# Patient Record
Sex: FEMALE | Race: WHITE | Employment: FULL TIME | URBAN - METROPOLITAN AREA
[De-identification: names, ages, dates, MRNs, and addresses within clinical notes are randomized per-mention and may not be internally consistent; named-entity substitution may affect disease eponyms.]

---

## 2018-06-01 ENCOUNTER — ANESTHESIA (OUTPATIENT)
Dept: OPERATING ROOM | Age: 38
End: 2018-06-01
Payer: COMMERCIAL

## 2018-06-01 ENCOUNTER — HOSPITAL ENCOUNTER (OUTPATIENT)
Age: 38
Setting detail: OUTPATIENT SURGERY
Discharge: HOME | End: 2018-06-01
Attending: LEGAL MEDICINE | Admitting: LEGAL MEDICINE
Payer: COMMERCIAL

## 2018-06-01 ENCOUNTER — ANESTHESIA EVENT (OUTPATIENT)
Dept: OPERATING ROOM | Age: 38
End: 2018-06-01
Payer: COMMERCIAL

## 2018-06-01 VITALS
BODY MASS INDEX: 47.09 KG/M2 | OXYGEN SATURATION: 98 % | WEIGHT: 293 LBS | HEART RATE: 64 BPM | RESPIRATION RATE: 16 BRPM | TEMPERATURE: 97 F | SYSTOLIC BLOOD PRESSURE: 110 MMHG | DIASTOLIC BLOOD PRESSURE: 66 MMHG | HEIGHT: 66 IN

## 2018-06-01 VITALS
OXYGEN SATURATION: 97 % | RESPIRATION RATE: 7 BRPM | DIASTOLIC BLOOD PRESSURE: 59 MMHG | SYSTOLIC BLOOD PRESSURE: 105 MMHG

## 2018-06-01 PROBLEM — N92.1 MENOMETRORRHAGIA: Status: ACTIVE | Noted: 2018-06-01

## 2018-06-01 LAB
ABO/RH: NORMAL
ANTIBODY SCREEN: NORMAL
HCG QUALITATIVE: NEGATIVE

## 2018-06-01 PROCEDURE — 86901 BLOOD TYPING SEROLOGIC RH(D): CPT

## 2018-06-01 PROCEDURE — 86900 BLOOD TYPING SEROLOGIC ABO: CPT

## 2018-06-01 PROCEDURE — 6360000002 HC RX W HCPCS: Performed by: ANESTHESIOLOGY

## 2018-06-01 PROCEDURE — C1886 CATHETER, ABLATION: HCPCS | Performed by: LEGAL MEDICINE

## 2018-06-01 PROCEDURE — 3700000000 HC ANESTHESIA ATTENDED CARE: Performed by: LEGAL MEDICINE

## 2018-06-01 PROCEDURE — 7100000001 HC PACU RECOVERY - ADDTL 15 MIN: Performed by: LEGAL MEDICINE

## 2018-06-01 PROCEDURE — 6360000002 HC RX W HCPCS: Performed by: LEGAL MEDICINE

## 2018-06-01 PROCEDURE — 36415 COLL VENOUS BLD VENIPUNCTURE: CPT

## 2018-06-01 PROCEDURE — 88305 TISSUE EXAM BY PATHOLOGIST: CPT

## 2018-06-01 PROCEDURE — 2580000003 HC RX 258: Performed by: LEGAL MEDICINE

## 2018-06-01 PROCEDURE — 6360000002 HC RX W HCPCS

## 2018-06-01 PROCEDURE — 86850 RBC ANTIBODY SCREEN: CPT

## 2018-06-01 PROCEDURE — 3600000003 HC SURGERY LEVEL 3 BASE: Performed by: LEGAL MEDICINE

## 2018-06-01 PROCEDURE — 6360000002 HC RX W HCPCS: Performed by: NURSE ANESTHETIST, CERTIFIED REGISTERED

## 2018-06-01 PROCEDURE — 3700000001 HC ADD 15 MINUTES (ANESTHESIA): Performed by: LEGAL MEDICINE

## 2018-06-01 PROCEDURE — 7100000010 HC PHASE II RECOVERY - FIRST 15 MIN: Performed by: LEGAL MEDICINE

## 2018-06-01 PROCEDURE — 7100000011 HC PHASE II RECOVERY - ADDTL 15 MIN: Performed by: LEGAL MEDICINE

## 2018-06-01 PROCEDURE — 84703 CHORIONIC GONADOTROPIN ASSAY: CPT

## 2018-06-01 PROCEDURE — 3600000013 HC SURGERY LEVEL 3 ADDTL 15MIN: Performed by: LEGAL MEDICINE

## 2018-06-01 PROCEDURE — 7100000000 HC PACU RECOVERY - FIRST 15 MIN: Performed by: LEGAL MEDICINE

## 2018-06-01 RX ORDER — FENTANYL CITRATE 50 UG/ML
INJECTION, SOLUTION INTRAMUSCULAR; INTRAVENOUS PRN
Status: DISCONTINUED | OUTPATIENT
Start: 2018-06-01 | End: 2018-06-01 | Stop reason: SDUPTHER

## 2018-06-01 RX ORDER — SODIUM CHLORIDE 0.9 % (FLUSH) 0.9 %
10 SYRINGE (ML) INJECTION EVERY 12 HOURS SCHEDULED
Status: DISCONTINUED | OUTPATIENT
Start: 2018-06-01 | End: 2018-06-04 | Stop reason: HOSPADM

## 2018-06-01 RX ORDER — ONDANSETRON 2 MG/ML
INJECTION INTRAMUSCULAR; INTRAVENOUS PRN
Status: DISCONTINUED | OUTPATIENT
Start: 2018-06-01 | End: 2018-06-01 | Stop reason: SDUPTHER

## 2018-06-01 RX ORDER — FENTANYL CITRATE 50 UG/ML
50 INJECTION, SOLUTION INTRAMUSCULAR; INTRAVENOUS EVERY 5 MIN PRN
Status: DISCONTINUED | OUTPATIENT
Start: 2018-06-01 | End: 2018-06-04 | Stop reason: HOSPADM

## 2018-06-01 RX ORDER — PROMETHAZINE HYDROCHLORIDE 25 MG/ML
6.25 INJECTION, SOLUTION INTRAMUSCULAR; INTRAVENOUS
Status: ACTIVE | OUTPATIENT
Start: 2018-06-01 | End: 2018-06-01

## 2018-06-01 RX ORDER — SODIUM CHLORIDE, SODIUM LACTATE, POTASSIUM CHLORIDE, CALCIUM CHLORIDE 600; 310; 30; 20 MG/100ML; MG/100ML; MG/100ML; MG/100ML
INJECTION, SOLUTION INTRAVENOUS CONTINUOUS
Status: DISCONTINUED | OUTPATIENT
Start: 2018-06-01 | End: 2018-06-04 | Stop reason: HOSPADM

## 2018-06-01 RX ORDER — SODIUM CHLORIDE 0.9 % (FLUSH) 0.9 %
10 SYRINGE (ML) INJECTION PRN
Status: CANCELLED | OUTPATIENT
Start: 2018-06-01

## 2018-06-01 RX ORDER — MEPERIDINE HYDROCHLORIDE 25 MG/ML
INJECTION INTRAMUSCULAR; INTRAVENOUS; SUBCUTANEOUS
Status: DISPENSED
Start: 2018-06-01 | End: 2018-06-01

## 2018-06-01 RX ORDER — SODIUM CHLORIDE 0.9 % (FLUSH) 0.9 %
10 SYRINGE (ML) INJECTION PRN
Status: DISCONTINUED | OUTPATIENT
Start: 2018-06-01 | End: 2018-06-04 | Stop reason: HOSPADM

## 2018-06-01 RX ORDER — MEPERIDINE HYDROCHLORIDE 25 MG/ML
12.5 INJECTION INTRAMUSCULAR; INTRAVENOUS; SUBCUTANEOUS EVERY 5 MIN PRN
Status: DISCONTINUED | OUTPATIENT
Start: 2018-06-01 | End: 2018-06-04 | Stop reason: HOSPADM

## 2018-06-01 RX ORDER — SODIUM CHLORIDE, SODIUM LACTATE, POTASSIUM CHLORIDE, CALCIUM CHLORIDE 600; 310; 30; 20 MG/100ML; MG/100ML; MG/100ML; MG/100ML
INJECTION, SOLUTION INTRAVENOUS CONTINUOUS
Status: CANCELLED | OUTPATIENT
Start: 2018-06-01

## 2018-06-01 RX ORDER — DIPHENHYDRAMINE HYDROCHLORIDE 50 MG/ML
50 INJECTION INTRAMUSCULAR; INTRAVENOUS ONCE
Status: CANCELLED | OUTPATIENT
Start: 2018-06-01

## 2018-06-01 RX ORDER — DIPHENHYDRAMINE HYDROCHLORIDE 50 MG/ML
INJECTION INTRAMUSCULAR; INTRAVENOUS
Status: COMPLETED
Start: 2018-06-01 | End: 2018-06-01

## 2018-06-01 RX ORDER — FENTANYL CITRATE 50 UG/ML
25 INJECTION, SOLUTION INTRAMUSCULAR; INTRAVENOUS EVERY 5 MIN PRN
Status: DISCONTINUED | OUTPATIENT
Start: 2018-06-01 | End: 2018-06-04 | Stop reason: HOSPADM

## 2018-06-01 RX ORDER — ONDANSETRON 2 MG/ML
4 INJECTION INTRAMUSCULAR; INTRAVENOUS EVERY 6 HOURS PRN
Status: CANCELLED | OUTPATIENT
Start: 2018-06-01

## 2018-06-01 RX ORDER — HYDROMORPHONE HCL 110MG/55ML
0.5 PATIENT CONTROLLED ANALGESIA SYRINGE INTRAVENOUS EVERY 5 MIN PRN
Status: DISCONTINUED | OUTPATIENT
Start: 2018-06-01 | End: 2018-06-04 | Stop reason: HOSPADM

## 2018-06-01 RX ORDER — HYDROMORPHONE HCL 110MG/55ML
0.25 PATIENT CONTROLLED ANALGESIA SYRINGE INTRAVENOUS EVERY 5 MIN PRN
Status: DISCONTINUED | OUTPATIENT
Start: 2018-06-01 | End: 2018-06-04 | Stop reason: HOSPADM

## 2018-06-01 RX ORDER — OXYCODONE HYDROCHLORIDE AND ACETAMINOPHEN 5; 325 MG/1; MG/1
2 TABLET ORAL EVERY 4 HOURS PRN
Status: CANCELLED | OUTPATIENT
Start: 2018-06-01

## 2018-06-01 RX ORDER — DEXAMETHASONE SODIUM PHOSPHATE 10 MG/ML
INJECTION, SOLUTION INTRAMUSCULAR; INTRAVENOUS PRN
Status: DISCONTINUED | OUTPATIENT
Start: 2018-06-01 | End: 2018-06-01 | Stop reason: SDUPTHER

## 2018-06-01 RX ORDER — ACETAMINOPHEN 325 MG/1
650 TABLET ORAL EVERY 4 HOURS PRN
Status: CANCELLED | OUTPATIENT
Start: 2018-06-01

## 2018-06-01 RX ORDER — ONDANSETRON 2 MG/ML
4 INJECTION INTRAMUSCULAR; INTRAVENOUS
Status: COMPLETED | OUTPATIENT
Start: 2018-06-01 | End: 2018-06-01

## 2018-06-01 RX ORDER — SODIUM CHLORIDE 0.9 % (FLUSH) 0.9 %
10 SYRINGE (ML) INJECTION EVERY 12 HOURS SCHEDULED
Status: CANCELLED | OUTPATIENT
Start: 2018-06-01

## 2018-06-01 RX ORDER — PROPOFOL 10 MG/ML
INJECTION, EMULSION INTRAVENOUS PRN
Status: DISCONTINUED | OUTPATIENT
Start: 2018-06-01 | End: 2018-06-01 | Stop reason: SDUPTHER

## 2018-06-01 RX ORDER — OXYCODONE HYDROCHLORIDE AND ACETAMINOPHEN 5; 325 MG/1; MG/1
1 TABLET ORAL EVERY 4 HOURS PRN
Status: CANCELLED | OUTPATIENT
Start: 2018-06-01

## 2018-06-01 RX ORDER — MIDAZOLAM HYDROCHLORIDE 1 MG/ML
INJECTION INTRAMUSCULAR; INTRAVENOUS PRN
Status: DISCONTINUED | OUTPATIENT
Start: 2018-06-01 | End: 2018-06-01 | Stop reason: SDUPTHER

## 2018-06-01 RX ADMIN — DIPHENHYDRAMINE HYDROCHLORIDE 50 MG: 50 INJECTION, SOLUTION INTRAMUSCULAR; INTRAVENOUS at 08:10

## 2018-06-01 RX ADMIN — ONDANSETRON 4 MG: 2 INJECTION INTRAMUSCULAR; INTRAVENOUS at 10:12

## 2018-06-01 RX ADMIN — MIDAZOLAM HYDROCHLORIDE 2 MG: 1 INJECTION INTRAMUSCULAR; INTRAVENOUS at 07:00

## 2018-06-01 RX ADMIN — FENTANYL CITRATE 50 MCG: 50 INJECTION, SOLUTION INTRAMUSCULAR; INTRAVENOUS at 07:25

## 2018-06-01 RX ADMIN — FENTANYL CITRATE 50 MCG: 50 INJECTION, SOLUTION INTRAMUSCULAR; INTRAVENOUS at 07:10

## 2018-06-01 RX ADMIN — MEPERIDINE HYDROCHLORIDE 12.5 MG: 25 INJECTION INTRAMUSCULAR; INTRAVENOUS; SUBCUTANEOUS at 08:25

## 2018-06-01 RX ADMIN — FENTANYL CITRATE 100 MCG: 50 INJECTION, SOLUTION INTRAMUSCULAR; INTRAVENOUS at 07:01

## 2018-06-01 RX ADMIN — ONDANSETRON 4 MG: 2 INJECTION, SOLUTION INTRAMUSCULAR; INTRAVENOUS at 07:20

## 2018-06-01 RX ADMIN — CEFAZOLIN SODIUM 3 G: 10 INJECTION, POWDER, FOR SOLUTION INTRAVENOUS at 07:11

## 2018-06-01 RX ADMIN — MEPERIDINE HYDROCHLORIDE 12.5 MG: 25 INJECTION INTRAMUSCULAR; INTRAVENOUS; SUBCUTANEOUS at 08:30

## 2018-06-01 RX ADMIN — DEXAMETHASONE SODIUM PHOSPHATE 10 MG: 10 INJECTION, SOLUTION INTRAMUSCULAR; INTRAVENOUS at 07:20

## 2018-06-01 RX ADMIN — PROPOFOL 200 MG: 10 INJECTION, EMULSION INTRAVENOUS at 07:08

## 2018-06-01 RX ADMIN — FENTANYL CITRATE 50 MCG: 50 INJECTION, SOLUTION INTRAMUSCULAR; INTRAVENOUS at 07:20

## 2018-06-01 RX ADMIN — SODIUM CHLORIDE, POTASSIUM CHLORIDE, SODIUM LACTATE AND CALCIUM CHLORIDE: 600; 310; 30; 20 INJECTION, SOLUTION INTRAVENOUS at 05:33

## 2018-06-01 RX ADMIN — ENOXAPARIN SODIUM 40 MG: 40 INJECTION SUBCUTANEOUS at 06:07

## 2018-06-01 ASSESSMENT — PULMONARY FUNCTION TESTS
PIF_VALUE: 2
PIF_VALUE: 6
PIF_VALUE: 3
PIF_VALUE: 12
PIF_VALUE: 5
PIF_VALUE: 4
PIF_VALUE: 1
PIF_VALUE: 2
PIF_VALUE: 5
PIF_VALUE: 1
PIF_VALUE: 6
PIF_VALUE: 4
PIF_VALUE: 3
PIF_VALUE: 3
PIF_VALUE: 5
PIF_VALUE: 6
PIF_VALUE: 3
PIF_VALUE: 13
PIF_VALUE: 2
PIF_VALUE: 3
PIF_VALUE: 1
PIF_VALUE: 5
PIF_VALUE: 5
PIF_VALUE: 1
PIF_VALUE: 4
PIF_VALUE: 3
PIF_VALUE: 3
PIF_VALUE: 32
PIF_VALUE: 4
PIF_VALUE: 3
PIF_VALUE: 4
PIF_VALUE: 4

## 2018-06-01 ASSESSMENT — PAIN DESCRIPTION - PAIN TYPE
TYPE: SURGICAL PAIN
TYPE: SURGICAL PAIN
TYPE: ACUTE PAIN;SURGICAL PAIN
TYPE: ACUTE PAIN;SURGICAL PAIN

## 2018-06-01 ASSESSMENT — PAIN DESCRIPTION - FREQUENCY
FREQUENCY: CONTINUOUS
FREQUENCY: INTERMITTENT
FREQUENCY: INTERMITTENT

## 2018-06-01 ASSESSMENT — PAIN DESCRIPTION - ORIENTATION
ORIENTATION: MID

## 2018-06-01 ASSESSMENT — PAIN DESCRIPTION - ONSET
ONSET: ON-GOING
ONSET: ON-GOING
ONSET: SUDDEN

## 2018-06-01 ASSESSMENT — PAIN DESCRIPTION - LOCATION
LOCATION: ABDOMEN

## 2018-06-01 ASSESSMENT — PAIN DESCRIPTION - PROGRESSION
CLINICAL_PROGRESSION: GRADUALLY WORSENING
CLINICAL_PROGRESSION: GRADUALLY IMPROVING
CLINICAL_PROGRESSION: GRADUALLY IMPROVING

## 2018-06-01 ASSESSMENT — PAIN DESCRIPTION - DESCRIPTORS
DESCRIPTORS: CRAMPING;BURNING
DESCRIPTORS: BURNING;DISCOMFORT;DULL
DESCRIPTORS: BURNING;DISCOMFORT;DULL

## 2018-06-01 ASSESSMENT — PAIN SCALES - GENERAL
PAINLEVEL_OUTOF10: 5
PAINLEVEL_OUTOF10: 0
PAINLEVEL_OUTOF10: 5
PAINLEVEL_OUTOF10: 6
PAINLEVEL_OUTOF10: 3
PAINLEVEL_OUTOF10: 2
PAINLEVEL_OUTOF10: 6

## 2018-06-01 ASSESSMENT — PAIN - FUNCTIONAL ASSESSMENT: PAIN_FUNCTIONAL_ASSESSMENT: 0-10

## 2018-12-27 ENCOUNTER — HOSPITAL ENCOUNTER (EMERGENCY)
Age: 38
Discharge: HOME | End: 2018-12-27
Payer: COMMERCIAL

## 2018-12-27 ENCOUNTER — APPOINTMENT (OUTPATIENT)
Dept: GENERAL RADIOLOGY | Age: 38
End: 2018-12-27
Payer: COMMERCIAL

## 2018-12-27 VITALS
TEMPERATURE: 98.5 F | DIASTOLIC BLOOD PRESSURE: 69 MMHG | OXYGEN SATURATION: 99 % | RESPIRATION RATE: 18 BRPM | HEIGHT: 66 IN | BODY MASS INDEX: 47.09 KG/M2 | SYSTOLIC BLOOD PRESSURE: 137 MMHG | WEIGHT: 293 LBS | HEART RATE: 99 BPM

## 2018-12-27 DIAGNOSIS — J45.901 EXACERBATION OF ASTHMA, UNSPECIFIED ASTHMA SEVERITY, UNSPECIFIED WHETHER PERSISTENT: Primary | ICD-10-CM

## 2018-12-27 PROCEDURE — 99285 EMERGENCY DEPT VISIT HI MDM: CPT

## 2018-12-27 PROCEDURE — 6370000000 HC RX 637 (ALT 250 FOR IP): Performed by: PHYSICIAN ASSISTANT

## 2018-12-27 PROCEDURE — 71046 X-RAY EXAM CHEST 2 VIEWS: CPT

## 2018-12-27 PROCEDURE — 94640 AIRWAY INHALATION TREATMENT: CPT

## 2018-12-27 PROCEDURE — 6360000002 HC RX W HCPCS: Performed by: PHYSICIAN ASSISTANT

## 2018-12-27 RX ORDER — PREDNISONE 20 MG/1
TABLET ORAL
Qty: 18 TABLET | Refills: 0 | Status: SHIPPED | OUTPATIENT
Start: 2018-12-27 | End: 2019-01-06

## 2018-12-27 RX ORDER — IPRATROPIUM BROMIDE AND ALBUTEROL SULFATE 2.5; .5 MG/3ML; MG/3ML
1 SOLUTION RESPIRATORY (INHALATION)
Status: COMPLETED | OUTPATIENT
Start: 2018-12-27 | End: 2018-12-27

## 2018-12-27 RX ORDER — ALBUTEROL SULFATE 90 UG/1
2 AEROSOL, METERED RESPIRATORY (INHALATION) EVERY 6 HOURS PRN
COMMUNITY

## 2018-12-27 RX ORDER — ALBUTEROL SULFATE 0.63 MG/3ML
1 SOLUTION RESPIRATORY (INHALATION) EVERY 6 HOURS PRN
COMMUNITY

## 2018-12-27 RX ORDER — DEXAMETHASONE SODIUM PHOSPHATE 10 MG/ML
10 INJECTION INTRAMUSCULAR; INTRAVENOUS ONCE
Status: COMPLETED | OUTPATIENT
Start: 2018-12-27 | End: 2018-12-27

## 2018-12-27 RX ADMIN — IPRATROPIUM BROMIDE AND ALBUTEROL SULFATE 1 AMPULE: .5; 3 SOLUTION RESPIRATORY (INHALATION) at 15:57

## 2018-12-27 RX ADMIN — IPRATROPIUM BROMIDE AND ALBUTEROL SULFATE 1 AMPULE: .5; 3 SOLUTION RESPIRATORY (INHALATION) at 15:30

## 2018-12-27 RX ADMIN — IPRATROPIUM BROMIDE AND ALBUTEROL SULFATE 1 AMPULE: .5; 3 SOLUTION RESPIRATORY (INHALATION) at 15:45

## 2018-12-27 RX ADMIN — DEXAMETHASONE SODIUM PHOSPHATE 10 MG: 10 INJECTION INTRAMUSCULAR; INTRAVENOUS at 15:34

## 2018-12-27 ASSESSMENT — PAIN DESCRIPTION - LOCATION: LOCATION: CHEST

## 2018-12-27 ASSESSMENT — PAIN DESCRIPTION - DESCRIPTORS: DESCRIPTORS: TIGHTNESS

## 2018-12-27 ASSESSMENT — PAIN SCALES - GENERAL: PAINLEVEL_OUTOF10: 6

## 2019-05-23 ENCOUNTER — APPOINTMENT (OUTPATIENT)
Dept: GENERAL RADIOLOGY | Age: 39
End: 2019-05-23
Payer: COMMERCIAL

## 2019-05-23 ENCOUNTER — HOSPITAL ENCOUNTER (EMERGENCY)
Age: 39
Discharge: HOME | End: 2019-05-23
Attending: EMERGENCY MEDICINE
Payer: COMMERCIAL

## 2019-05-23 VITALS
RESPIRATION RATE: 16 BRPM | OXYGEN SATURATION: 98 % | SYSTOLIC BLOOD PRESSURE: 148 MMHG | DIASTOLIC BLOOD PRESSURE: 79 MMHG | WEIGHT: 293 LBS | HEART RATE: 81 BPM | BODY MASS INDEX: 66.5 KG/M2 | TEMPERATURE: 98.6 F

## 2019-05-23 DIAGNOSIS — R10.30 LOWER ABDOMINAL PAIN: Primary | ICD-10-CM

## 2019-05-23 DIAGNOSIS — N80.9 ENDOMETRIOSIS: ICD-10-CM

## 2019-05-23 LAB
ALBUMIN SERPL-MCNC: 3.1 G/DL (ref 3.5–5.2)
ALP BLD-CCNC: 87 U/L (ref 35–104)
ALT SERPL-CCNC: 28 U/L (ref 0–32)
AMYLASE: 35 U/L (ref 20–100)
ANION GAP SERPL CALCULATED.3IONS-SCNC: 8 MMOL/L (ref 7–16)
AST SERPL-CCNC: 34 U/L (ref 0–31)
BACTERIA: ABNORMAL /HPF
BASOPHILS ABSOLUTE: 0.03 E9/L (ref 0–0.2)
BASOPHILS RELATIVE PERCENT: 0.3 % (ref 0–2)
BILIRUB SERPL-MCNC: 0.3 MG/DL (ref 0–1.2)
BILIRUBIN URINE: NEGATIVE
BLOOD, URINE: ABNORMAL
BUN BLDV-MCNC: 12 MG/DL (ref 6–20)
CALCIUM SERPL-MCNC: 8.9 MG/DL (ref 8.6–10.2)
CHLORIDE BLD-SCNC: 99 MMOL/L (ref 98–107)
CLARITY: ABNORMAL
CO2: 28 MMOL/L (ref 22–29)
COLOR: YELLOW
CREAT SERPL-MCNC: 0.7 MG/DL (ref 0.5–1)
EOSINOPHILS ABSOLUTE: 0.12 E9/L (ref 0.05–0.5)
EOSINOPHILS RELATIVE PERCENT: 1.1 % (ref 0–6)
EPITHELIAL CELLS, UA: ABNORMAL /HPF
GFR AFRICAN AMERICAN: >60
GFR NON-AFRICAN AMERICAN: >60 ML/MIN/1.73
GLUCOSE BLD-MCNC: 141 MG/DL (ref 74–99)
GLUCOSE URINE: NEGATIVE MG/DL
HCG(URINE) PREGNANCY TEST: NEGATIVE
HCT VFR BLD CALC: 39.6 % (ref 34–48)
HEMOGLOBIN: 13 G/DL (ref 11.5–15.5)
IMMATURE GRANULOCYTES #: 0.03 E9/L
IMMATURE GRANULOCYTES %: 0.3 % (ref 0–5)
KETONES, URINE: NEGATIVE MG/DL
LEUKOCYTE ESTERASE, URINE: NEGATIVE
LIPASE: 22 U/L (ref 13–60)
LYMPHOCYTES ABSOLUTE: 1.66 E9/L (ref 1.5–4)
LYMPHOCYTES RELATIVE PERCENT: 15.7 % (ref 20–42)
MCH RBC QN AUTO: 30.5 PG (ref 26–35)
MCHC RBC AUTO-ENTMCNC: 32.8 % (ref 32–34.5)
MCV RBC AUTO: 93 FL (ref 80–99.9)
MONOCYTES ABSOLUTE: 0.7 E9/L (ref 0.1–0.95)
MONOCYTES RELATIVE PERCENT: 6.6 % (ref 2–12)
NEUTROPHILS ABSOLUTE: 8 E9/L (ref 1.8–7.3)
NEUTROPHILS RELATIVE PERCENT: 76 % (ref 43–80)
NITRITE, URINE: NEGATIVE
PDW BLD-RTO: 13.5 FL (ref 11.5–15)
PH UA: 5.5 (ref 5–9)
PLATELET # BLD: 276 E9/L (ref 130–450)
PMV BLD AUTO: 10 FL (ref 7–12)
POTASSIUM REFLEX MAGNESIUM: 5.2 MMOL/L (ref 3.5–5)
PROTEIN UA: NEGATIVE MG/DL
RBC # BLD: 4.26 E12/L (ref 3.5–5.5)
RBC UA: ABNORMAL /HPF (ref 0–2)
SODIUM BLD-SCNC: 135 MMOL/L (ref 132–146)
SPECIFIC GRAVITY UA: >=1.03 (ref 1–1.03)
TOTAL PROTEIN: 6.7 G/DL (ref 6.4–8.3)
UROBILINOGEN, URINE: 0.2 E.U./DL
WBC # BLD: 10.5 E9/L (ref 4.5–11.5)
WBC UA: ABNORMAL /HPF (ref 0–5)

## 2019-05-23 PROCEDURE — 80053 COMPREHEN METABOLIC PANEL: CPT

## 2019-05-23 PROCEDURE — 83690 ASSAY OF LIPASE: CPT

## 2019-05-23 PROCEDURE — 74018 RADEX ABDOMEN 1 VIEW: CPT

## 2019-05-23 PROCEDURE — 82150 ASSAY OF AMYLASE: CPT

## 2019-05-23 PROCEDURE — 85025 COMPLETE CBC W/AUTO DIFF WBC: CPT

## 2019-05-23 PROCEDURE — 6360000002 HC RX W HCPCS: Performed by: EMERGENCY MEDICINE

## 2019-05-23 PROCEDURE — 99284 EMERGENCY DEPT VISIT MOD MDM: CPT

## 2019-05-23 PROCEDURE — 36415 COLL VENOUS BLD VENIPUNCTURE: CPT

## 2019-05-23 PROCEDURE — 96374 THER/PROPH/DIAG INJ IV PUSH: CPT

## 2019-05-23 PROCEDURE — 81001 URINALYSIS AUTO W/SCOPE: CPT

## 2019-05-23 PROCEDURE — 87088 URINE BACTERIA CULTURE: CPT

## 2019-05-23 PROCEDURE — 81025 URINE PREGNANCY TEST: CPT

## 2019-05-23 RX ORDER — KETOROLAC TROMETHAMINE 10 MG/1
10 TABLET, FILM COATED ORAL EVERY 6 HOURS PRN
Qty: 20 TABLET | Refills: 0 | Status: SHIPPED | OUTPATIENT
Start: 2019-05-23

## 2019-05-23 RX ORDER — KETOROLAC TROMETHAMINE 30 MG/ML
60 INJECTION, SOLUTION INTRAMUSCULAR; INTRAVENOUS ONCE
Status: DISCONTINUED | OUTPATIENT
Start: 2019-05-23 | End: 2019-05-23

## 2019-05-23 RX ORDER — KETOROLAC TROMETHAMINE 30 MG/ML
30 INJECTION, SOLUTION INTRAMUSCULAR; INTRAVENOUS ONCE
Status: COMPLETED | OUTPATIENT
Start: 2019-05-23 | End: 2019-05-23

## 2019-05-23 RX ORDER — LISINOPRIL 10 MG/1
10 TABLET ORAL ONCE
Status: DISCONTINUED | OUTPATIENT
Start: 2019-05-23 | End: 2019-05-23 | Stop reason: HOSPADM

## 2019-05-23 RX ADMIN — KETOROLAC TROMETHAMINE 30 MG: 30 INJECTION, SOLUTION INTRAMUSCULAR; INTRAVENOUS at 14:09

## 2019-05-23 ASSESSMENT — PAIN SCALES - GENERAL: PAINLEVEL_OUTOF10: 10

## 2019-05-23 NOTE — ED PROVIDER NOTES
Department of Emergency Medicine   ED  Provider Note  Admit Date/RoomTime: 2019 12:03 PM  ED Room:   Chief Complaint     Abdominal Pain (abd pain x 12 hours, no n/v/, loose stools, no dysuria, no vaginal c/o)    History of Present Illness   Source of history provided by:  patient. History/Exam Limitations: none. Devin Smith is a 42 y.o. old female who has a past medical history of:   Past Medical History:   Diagnosis Date    Asthma     hasnt had problems for years, 2018    Sleep apnea      who presents emergency room for evaluation of recurrent abdominal pain for the last 12 hours. No relieving factors. She describes the pain as cramping in nature. No nausea vomiting or diarrhea. No other constitutional symptoms. No dysuria or urinary frequency. No hematuria. She has known history of endometriosis for which she had an ablation last year and has had no problems since then until now. No other relieving or aggravating factors identified. ROS   Pertinent positives and negatives are stated within HPI, all other systems reviewed and are negative. Past Surgical History:   Procedure Laterality Date    APPENDECTOMY       SECTION      DILATION AND CURETTAGE OF UTERUS      NM HYSTEROSCOPY,W/ENDOMETRIAL ABLATION N/A 2018    DILATATION AND CURETTAGE HYSTEROSCOPY WITH ENDOMETRIAL ABLATION performed by Dasha Phan MD at [address]     Social History:  reports that she has never smoked. She has never used smokeless tobacco. She reports that she does not drink alcohol or use drugs. Family History: family history is not on file. Allergies: Bactrim [sulfamethoxazole-trimethoprim]; Amoxil [amoxicillin];  Imitrex [sumatriptan]; and Ultram [tramadol hcl]    Physical Exam           ED Triage Vitals [19 1207]   BP Temp Temp src Pulse Resp SpO2 Height Weight   (!) 180/80 97.5 °F (36.4 °C) -- 106 20 96 % -- (!) 412 lb (186.9 kg)      Oxygen Saturation Sodium 135 132 - 146 mmol/L    Potassium reflex Magnesium 5.2 (H) 3.5 - 5.0 mmol/L    Chloride 99 98 - 107 mmol/L    CO2 28 22 - 29 mmol/L    Anion Gap 8 7 - 16 mmol/L    Glucose 141 (H) 74 - 99 mg/dL    BUN 12 6 - 20 mg/dL    CREATININE 0.7 0.5 - 1.0 mg/dL    GFR Non-African American >60 >=60 mL/min/1.73    GFR African American >60     Calcium 8.9 8.6 - 10.2 mg/dL    Total Protein 6.7 6.4 - 8.3 g/dL    Alb 3.1 (L) 3.5 - 5.2 g/dL    Total Bilirubin 0.3 0.0 - 1.2 mg/dL    Alkaline Phosphatase 87 35 - 104 U/L    ALT 28 0 - 32 U/L    AST 34 (H) 0 - 31 U/L   Urinalysis, reflex to microscopic   Result Value Ref Range    Color, UA Yellow Straw/Yellow    Clarity, UA SLCLOUDY Clear    Glucose, Ur Negative Negative mg/dL    Bilirubin Urine Negative Negative    Ketones, Urine Negative Negative mg/dL    Specific Gravity, UA >=1.030 1.005 - 1.030    Blood, Urine MODERATE (A) Negative    pH, UA 5.5 5.0 - 9.0    Protein, UA Negative Negative mg/dL    Urobilinogen, Urine 0.2 <2.0 E.U./dL    Nitrite, Urine Negative Negative    Leukocyte Esterase, Urine Negative Negative   Lipase   Result Value Ref Range    Lipase 22 13 - 60 U/L   Amylase   Result Value Ref Range    Amylase 35 20 - 100 U/L   Microscopic Urinalysis   Result Value Ref Range    WBC, UA NONE 0 - 5 /HPF    RBC, UA 0-1 0 - 2 /HPF    Epi Cells RARE /HPF    Bacteria, UA MANY (A) /HPF   Pregnancy, urine   Result Value Ref Range    HCG(Urine) Pregnancy Test NEGATIVE NEGATIVE     Imaging: All Radiology results interpreted by Radiologist unless otherwise noted. XR ABDOMEN (KUB) (SINGLE AP VIEW)   Final Result   Nonspecific nonobstructive bowel gas pattern          ED Course / Medical Decision Making     Medications   ketorolac (TORADOL) injection 30 mg (30 mg Intravenous Given 5/23/19 1409)        Re-Evaluations:  5/23/19      Time: 1620    Patients symptoms are improving.     Consultations:             None    Procedures:   none    MDM:  Patient is showing clinical improvement will be discharged home to follow up with her primary care physician in 2-3 days. No abnormal lab values that would warrant an inpatient admission. She was advised to take medicines as prescribed with meals and to return to emergency room for any worsening symptoms. Counseling:   I have spoken with the patient and discussed todays results, in addition to providing specific details for the plan of care and counseling regarding the diagnosis and prognosis and are agreeable with the plan of care. Assessment      1. Lower abdominal pain Stable   2. Endometriosis      This patient's ED course included: a personal history and physicial examination and re-evaluation prior to disposition  This patient has remained hemodynamically stable and improved during their ED course. Plan   Discharge to home. Patient condition is good. New Medications     Discharge Medication List as of 5/23/2019  4:47 PM      START taking these medications    Details   ketorolac (TORADOL) 10 MG tablet Take 1 tablet by mouth every 6 hours as needed for Pain, Disp-20 tablet, R-0Print           Electronically signed by Kelly Gfof MD   DD: 5/23/19  **This report was transcribed using voice recognition software. Every effort was made to ensure accuracy; however, inadvertent computerized transcription errors may be present.   END OF PROVIDER NOTE      Kelly Goff MD  05/23/19 2006

## 2019-05-25 LAB — URINE CULTURE, ROUTINE: NORMAL

## 2021-09-10 ENCOUNTER — APPOINTMENT (OUTPATIENT)
Dept: CT IMAGING | Age: 41
DRG: 690 | End: 2021-09-10
Payer: COMMERCIAL

## 2021-09-10 ENCOUNTER — HOSPITAL ENCOUNTER (EMERGENCY)
Age: 41
Discharge: HOME | DRG: 690 | End: 2021-09-10
Attending: EMERGENCY MEDICINE
Payer: COMMERCIAL

## 2021-09-10 VITALS
RESPIRATION RATE: 22 BRPM | HEART RATE: 109 BPM | DIASTOLIC BLOOD PRESSURE: 86 MMHG | OXYGEN SATURATION: 97 % | SYSTOLIC BLOOD PRESSURE: 145 MMHG | TEMPERATURE: 99.8 F

## 2021-09-10 DIAGNOSIS — R10.84 GENERALIZED ABDOMINAL PAIN: Primary | ICD-10-CM

## 2021-09-10 DIAGNOSIS — R11.2 NAUSEA AND VOMITING, INTRACTABILITY OF VOMITING NOT SPECIFIED, UNSPECIFIED VOMITING TYPE: ICD-10-CM

## 2021-09-10 LAB
ALBUMIN SERPL-MCNC: 3.6 G/DL (ref 3.5–5.2)
ALP BLD-CCNC: 106 U/L (ref 35–104)
ALT SERPL-CCNC: 50 U/L (ref 0–32)
AMORPHOUS: ABNORMAL
ANION GAP SERPL CALCULATED.3IONS-SCNC: 12 MMOL/L (ref 7–16)
APTT: 25.3 SEC (ref 24.5–35.1)
AST SERPL-CCNC: 36 U/L (ref 0–31)
BACTERIA: ABNORMAL /HPF
BASOPHILS ABSOLUTE: 0 E9/L (ref 0–0.2)
BASOPHILS RELATIVE PERCENT: 0.3 % (ref 0–2)
BILIRUB SERPL-MCNC: 1 MG/DL (ref 0–1.2)
BILIRUBIN URINE: NEGATIVE
BLOOD, URINE: ABNORMAL
BUN BLDV-MCNC: 11 MG/DL (ref 6–20)
CALCIUM SERPL-MCNC: 9 MG/DL (ref 8.6–10.2)
CHLORIDE BLD-SCNC: 103 MMOL/L (ref 98–107)
CLARITY: CLEAR
CO2: 23 MMOL/L (ref 22–29)
COLOR: YELLOW
CREAT SERPL-MCNC: 0.8 MG/DL (ref 0.5–1)
EKG ATRIAL RATE: 110 BPM
EKG P AXIS: 46 DEGREES
EKG P-R INTERVAL: 126 MS
EKG Q-T INTERVAL: 318 MS
EKG QRS DURATION: 84 MS
EKG QTC CALCULATION (BAZETT): 430 MS
EKG R AXIS: 44 DEGREES
EKG T AXIS: 32 DEGREES
EKG VENTRICULAR RATE: 110 BPM
EOSINOPHILS ABSOLUTE: 0.05 E9/L (ref 0.05–0.5)
EOSINOPHILS RELATIVE PERCENT: 0.9 % (ref 0–6)
EPITHELIAL CELLS, UA: ABNORMAL /HPF
GFR AFRICAN AMERICAN: >60
GFR NON-AFRICAN AMERICAN: >60 ML/MIN/1.73
GLUCOSE BLD-MCNC: 118 MG/DL (ref 74–99)
GLUCOSE URINE: NEGATIVE MG/DL
HCG(URINE) PREGNANCY TEST: NEGATIVE
HCT VFR BLD CALC: 42 % (ref 34–48)
HEMOGLOBIN: 13.8 G/DL (ref 11.5–15.5)
INR BLD: 1.1
KETONES, URINE: NEGATIVE MG/DL
LACTIC ACID, SEPSIS: 2.3 MMOL/L (ref 0.5–1.9)
LACTIC ACID, SEPSIS: 3.9 MMOL/L (ref 0.5–1.9)
LEUKOCYTE ESTERASE, URINE: NEGATIVE
LIPASE: 20 U/L (ref 13–60)
LYMPHOCYTES ABSOLUTE: 0.18 E9/L (ref 1.5–4)
LYMPHOCYTES RELATIVE PERCENT: 2.6 % (ref 20–42)
MAGNESIUM: 1.3 MG/DL (ref 1.6–2.6)
MCH RBC QN AUTO: 30.5 PG (ref 26–35)
MCHC RBC AUTO-ENTMCNC: 32.9 % (ref 32–34.5)
MCV RBC AUTO: 92.7 FL (ref 80–99.9)
MONOCYTES ABSOLUTE: 0.06 E9/L (ref 0.1–0.95)
MONOCYTES RELATIVE PERCENT: 0.9 % (ref 2–12)
NEUTROPHILS ABSOLUTE: 5.86 E9/L (ref 1.8–7.3)
NEUTROPHILS RELATIVE PERCENT: 95.6 % (ref 43–80)
NITRITE, URINE: NEGATIVE
PDW BLD-RTO: 13.5 FL (ref 11.5–15)
PH UA: 5.5 (ref 5–9)
PLATELET # BLD: 233 E9/L (ref 130–450)
PMV BLD AUTO: 10 FL (ref 7–12)
POTASSIUM SERPL-SCNC: 3.8 MMOL/L (ref 3.5–5)
PRO-BNP: 34 PG/ML (ref 0–125)
PROTEIN UA: NEGATIVE MG/DL
PROTHROMBIN TIME: 12.6 SEC (ref 9.3–12.4)
RBC # BLD: 4.53 E12/L (ref 3.5–5.5)
RBC # BLD: NORMAL 10*6/UL
RBC UA: ABNORMAL /HPF (ref 0–2)
SODIUM BLD-SCNC: 138 MMOL/L (ref 132–146)
SPECIFIC GRAVITY UA: 1.01 (ref 1–1.03)
TOTAL PROTEIN: 6.9 G/DL (ref 6.4–8.3)
UROBILINOGEN, URINE: 0.2 E.U./DL
VACUOLATED NEUTROPHILS: ABNORMAL
WBC # BLD: 6.1 E9/L (ref 4.5–11.5)
WBC UA: ABNORMAL /HPF (ref 0–5)

## 2021-09-10 PROCEDURE — 87186 SC STD MICRODIL/AGAR DIL: CPT

## 2021-09-10 PROCEDURE — 81025 URINE PREGNANCY TEST: CPT

## 2021-09-10 PROCEDURE — 87040 BLOOD CULTURE FOR BACTERIA: CPT

## 2021-09-10 PROCEDURE — 6360000002 HC RX W HCPCS: Performed by: STUDENT IN AN ORGANIZED HEALTH CARE EDUCATION/TRAINING PROGRAM

## 2021-09-10 PROCEDURE — 6360000004 HC RX CONTRAST MEDICATION: Performed by: RADIOLOGY

## 2021-09-10 PROCEDURE — 83605 ASSAY OF LACTIC ACID: CPT

## 2021-09-10 PROCEDURE — 74177 CT ABD & PELVIS W/CONTRAST: CPT

## 2021-09-10 PROCEDURE — 80053 COMPREHEN METABOLIC PANEL: CPT

## 2021-09-10 PROCEDURE — 83880 ASSAY OF NATRIURETIC PEPTIDE: CPT

## 2021-09-10 PROCEDURE — 2580000003 HC RX 258: Performed by: STUDENT IN AN ORGANIZED HEALTH CARE EDUCATION/TRAINING PROGRAM

## 2021-09-10 PROCEDURE — 93005 ELECTROCARDIOGRAM TRACING: CPT | Performed by: STUDENT IN AN ORGANIZED HEALTH CARE EDUCATION/TRAINING PROGRAM

## 2021-09-10 PROCEDURE — 85610 PROTHROMBIN TIME: CPT

## 2021-09-10 PROCEDURE — 87150 DNA/RNA AMPLIFIED PROBE: CPT

## 2021-09-10 PROCEDURE — 85730 THROMBOPLASTIN TIME PARTIAL: CPT

## 2021-09-10 PROCEDURE — 83735 ASSAY OF MAGNESIUM: CPT

## 2021-09-10 PROCEDURE — 85025 COMPLETE CBC W/AUTO DIFF WBC: CPT

## 2021-09-10 PROCEDURE — 93010 ELECTROCARDIOGRAM REPORT: CPT | Performed by: INTERNAL MEDICINE

## 2021-09-10 PROCEDURE — 81001 URINALYSIS AUTO W/SCOPE: CPT

## 2021-09-10 PROCEDURE — 36415 COLL VENOUS BLD VENIPUNCTURE: CPT

## 2021-09-10 PROCEDURE — 87088 URINE BACTERIA CULTURE: CPT

## 2021-09-10 PROCEDURE — 83690 ASSAY OF LIPASE: CPT

## 2021-09-10 PROCEDURE — 87077 CULTURE AEROBIC IDENTIFY: CPT

## 2021-09-10 RX ORDER — HYDROCODONE BITARTRATE AND ACETAMINOPHEN 5; 325 MG/1; MG/1
1 TABLET ORAL EVERY 4 HOURS PRN
Qty: 18 TABLET | Refills: 0 | Status: ON HOLD | OUTPATIENT
Start: 2021-09-10 | End: 2021-09-14 | Stop reason: HOSPADM

## 2021-09-10 RX ORDER — ONDANSETRON 2 MG/ML
4 INJECTION INTRAMUSCULAR; INTRAVENOUS ONCE
Status: COMPLETED | OUTPATIENT
Start: 2021-09-10 | End: 2021-09-10

## 2021-09-10 RX ORDER — KETOROLAC TROMETHAMINE 15 MG/ML
15 INJECTION, SOLUTION INTRAMUSCULAR; INTRAVENOUS ONCE
Status: COMPLETED | OUTPATIENT
Start: 2021-09-10 | End: 2021-09-10

## 2021-09-10 RX ORDER — ONDANSETRON 4 MG/1
4 TABLET, ORALLY DISINTEGRATING ORAL EVERY 12 HOURS PRN
Qty: 4 TABLET | Refills: 0 | Status: ON HOLD | OUTPATIENT
Start: 2021-09-10 | End: 2021-09-14 | Stop reason: HOSPADM

## 2021-09-10 RX ORDER — MORPHINE SULFATE 10 MG/ML
6 INJECTION, SOLUTION INTRAMUSCULAR; INTRAVENOUS ONCE
Status: COMPLETED | OUTPATIENT
Start: 2021-09-10 | End: 2021-09-10

## 2021-09-10 RX ORDER — FENTANYL CITRATE 50 UG/ML
50 INJECTION, SOLUTION INTRAMUSCULAR; INTRAVENOUS ONCE
Status: COMPLETED | OUTPATIENT
Start: 2021-09-10 | End: 2021-09-10

## 2021-09-10 RX ORDER — MAGNESIUM SULFATE 1 G/100ML
1000 INJECTION INTRAVENOUS ONCE
Status: COMPLETED | OUTPATIENT
Start: 2021-09-10 | End: 2021-09-10

## 2021-09-10 RX ORDER — 0.9 % SODIUM CHLORIDE 0.9 %
1000 INTRAVENOUS SOLUTION INTRAVENOUS ONCE
Status: COMPLETED | OUTPATIENT
Start: 2021-09-10 | End: 2021-09-10

## 2021-09-10 RX ADMIN — FENTANYL CITRATE 50 MCG: 0.05 INJECTION, SOLUTION INTRAMUSCULAR; INTRAVENOUS at 12:36

## 2021-09-10 RX ADMIN — SODIUM CHLORIDE 1000 ML: 9 INJECTION, SOLUTION INTRAVENOUS at 14:27

## 2021-09-10 RX ADMIN — MAGNESIUM SULFATE HEPTAHYDRATE 1000 MG: 1 INJECTION, SOLUTION INTRAVENOUS at 14:28

## 2021-09-10 RX ADMIN — ONDANSETRON 4 MG: 2 INJECTION INTRAMUSCULAR; INTRAVENOUS at 12:36

## 2021-09-10 RX ADMIN — MORPHINE SULFATE 6 MG: 10 INJECTION, SOLUTION INTRAMUSCULAR; INTRAVENOUS at 17:28

## 2021-09-10 RX ADMIN — SODIUM CHLORIDE 1000 ML: 9 INJECTION, SOLUTION INTRAVENOUS at 12:35

## 2021-09-10 RX ADMIN — KETOROLAC TROMETHAMINE 15 MG: 15 INJECTION, SOLUTION INTRAMUSCULAR; INTRAVENOUS at 14:27

## 2021-09-10 RX ADMIN — IOPAMIDOL 75 ML: 755 INJECTION, SOLUTION INTRAVENOUS at 14:19

## 2021-09-10 RX ADMIN — ONDANSETRON 4 MG: 2 INJECTION INTRAMUSCULAR; INTRAVENOUS at 17:28

## 2021-09-10 ASSESSMENT — PAIN SCALES - GENERAL
PAINLEVEL_OUTOF10: 9
PAINLEVEL_OUTOF10: 10
PAINLEVEL_OUTOF10: 8

## 2021-09-10 ASSESSMENT — ENCOUNTER SYMPTOMS: TACHYPNEA: 1

## 2021-09-10 NOTE — ED PROVIDER NOTES
[address]      Pt Name: Ros Lynn  MRN: [id number]  [place] 1980  Date of evaluation: 9/10/2021      CHIEF COMPLAINT       Chief Complaint   Patient presents with    Nausea     Pt C/O abd pain / N/V / chills and fatigue x ? days.  Emesis    Abdominal Pain    Chills        HPI  Ros Lynn is a 46 y.o. female with past medical history of tubal ligation, endometrial ablation, appendectomy presents with complaints of nausea, vomiting, chills, and right flank pain. She describes her pain as dull, achy, right flank. States it is nonradiating. No alleviating or exacerbating factors. Not taking any medications or measures to alleviate her symptoms. Admits to bleeding but cannot state if this is hematuria or vaginal bleeding. Denies dysuria, polyuria, hematochezia, vaginal discharge, or trauma. Except as noted above the remainder of the review of systems was reviewed and negative. Review of Systems   Constitutional: Negative for chills and fever. HENT: Negative for ear pain, sinus pressure and sore throat. Eyes: Negative for visual disturbance. Respiratory: Negative for cough, shortness of breath and wheezing. Cardiovascular: Negative for chest pain, palpitations and leg swelling. Gastrointestinal: Positive for abdominal pain. Negative for abdominal distention, constipation, diarrhea, nausea and vomiting. Endocrine: Negative for polyuria. Genitourinary: Positive for flank pain. Negative for dysuria and frequency. Musculoskeletal: Negative for arthralgias and back pain. Skin: Negative for color change, pallor, rash and wound. Neurological: Negative for weakness and headaches. Hematological: Negative for adenopathy. Psychiatric/Behavioral: Negative for agitation. All other systems reviewed and are negative. Physical Exam  Vitals and nursing note reviewed.  Exam conducted with a chaperone present. Constitutional:       General: She is not in acute distress. Appearance: Normal appearance. She is not ill-appearing or diaphoretic. HENT:      Head: Normocephalic and atraumatic. Nose: Nose normal.   Eyes:      Extraocular Movements: Extraocular movements intact. Pupils: Pupils are equal, round, and reactive to light. Cardiovascular:      Rate and Rhythm: Regular rhythm. Tachycardia present. Pulses: Normal pulses. Heart sounds: Normal heart sounds. No murmur heard. No gallop. Pulmonary:      Effort: Pulmonary effort is normal.      Breath sounds: Normal breath sounds. Abdominal:      General: Abdomen is flat. Bowel sounds are normal.      Palpations: Abdomen is soft. Tenderness: There is abdominal tenderness. There is no right CVA tenderness, left CVA tenderness or rebound. Negative signs include Flynn's sign, Rovsing's sign and McBurney's sign. Hernia: No hernia is present. Genitourinary:     Comments: Chaperone at bedside. Cervical os appreciated. No blood in vaginal vault. No lesions. No lacerations. Musculoskeletal:         General: Normal range of motion. Cervical back: Normal range of motion. Skin:     General: Skin is warm and dry. Capillary Refill: Capillary refill takes less than 2 seconds. Neurological:      General: No focal deficit present. Mental Status: She is alert and oriented to person, place, and time. Psychiatric:         Mood and Affect: Mood normal.          Procedures     MDM  Number of Diagnoses or Management Options  Generalized abdominal pain  Nausea and vomiting, intractability of vomiting not specified, unspecified vomiting type  Diagnosis management comments: 80-year-old female with past medical history of endometriosis, with surgical history of endometrial ablation, as well as tubal ligation presents with complaints of nausea, vomiting, right flank pain.   Vitals significant for tachycardia. On physical exam patient is in no acute distress, but does seem uncomfortable. She is obese. With diffuse abdominal pain. No rebound or guarding appreciated. Negative CVA tenderness bilaterally. Chaperone at bedside. Pelvic exam showed no blood in vaginal vault. No lacerations or tears appreciated. Diagnostic labs and imaging interpreted and reviewed. Significant for hematuria and urinalysis. I suspect in patient may had had a ureteral stone that has passed. No signs of infection on urinalysis. I do not suspect this is pyelonephritis. She had no adnexal tenderness. I do not suspect that the patient has ovarian torsion. Patient was given analgesic medication, antiemetics with moderate relief of symptoms. She was informed of all the results evaluation she is agreeable plan for discharge. She will follow-up with her primary care physician as soon as she is able. Amount and/or Complexity of Data Reviewed  Decide to obtain previous medical records or to obtain history from someone other than the patient: yes              --------------------------------------------- PAST HISTORY ---------------------------------------------  Past Medical History:  has a past medical history of Asthma and Sleep apnea. Past Surgical History:  has a past surgical history that includes Appendectomy;  section; Tubal ligation; Dilation and curettage of uterus; and pr hysteroscopy,w/endometrial ablation (N/A, 2018). Social History:  reports that she has never smoked. She has never used smokeless tobacco. She reports that she does not drink alcohol and does not use drugs. Family History: family history is not on file. The patients home medications have been reviewed.     Allergies: Bactrim [sulfamethoxazole-trimethoprim], Amoxil [amoxicillin], Imitrex [sumatriptan], and Ultram [tramadol hcl]    -------------------------------------------------- RESULTS -------------------------------------------------  Labs:  Results for orders placed or performed during the hospital encounter of 09/10/21   CBC Auto Differential   Result Value Ref Range    WBC 6.1 4.5 - 11.5 E9/L    RBC 4.53 3.50 - 5.50 E12/L    Hemoglobin 13.8 11.5 - 15.5 g/dL    Hematocrit 42.0 34.0 - 48.0 %    MCV 92.7 80.0 - 99.9 fL    MCH 30.5 26.0 - 35.0 pg    MCHC 32.9 32.0 - 34.5 %    RDW 13.5 11.5 - 15.0 fL    Platelets 341 733 - 546 E9/L    MPV 10.0 7.0 - 12.0 fL    Neutrophils % 95.6 (H) 43.0 - 80.0 %    Lymphocytes % 2.6 (L) 20.0 - 42.0 %    Monocytes % 0.9 (L) 2.0 - 12.0 %    Eosinophils % 0.9 0.0 - 6.0 %    Basophils % 0.3 0.0 - 2.0 %    Neutrophils Absolute 5.86 1.80 - 7.30 E9/L    Lymphocytes Absolute 0.18 (L) 1.50 - 4.00 E9/L    Monocytes Absolute 0.06 (L) 0.10 - 0.95 E9/L    Eosinophils Absolute 0.05 0.05 - 0.50 E9/L    Basophils Absolute 0.00 0.00 - 0.20 E9/L    Vacuolated Neutrophils 1+     RBC Morphology Normal    Comprehensive Metabolic Panel   Result Value Ref Range    Sodium 138 132 - 146 mmol/L    Potassium 3.8 3.5 - 5.0 mmol/L    Chloride 103 98 - 107 mmol/L    CO2 23 22 - 29 mmol/L    Anion Gap 12 7 - 16 mmol/L    Glucose 118 (H) 74 - 99 mg/dL    BUN 11 6 - 20 mg/dL    CREATININE 0.8 0.5 - 1.0 mg/dL    GFR Non-African American >60 >=60 mL/min/1.73    GFR African American >60     Calcium 9.0 8.6 - 10.2 mg/dL    Total Protein 6.9 6.4 - 8.3 g/dL    Albumin 3.6 3.5 - 5.2 g/dL    Total Bilirubin 1.0 0.0 - 1.2 mg/dL    Alkaline Phosphatase 106 (H) 35 - 104 U/L    ALT 50 (H) 0 - 32 U/L    AST 36 (H) 0 - 31 U/L   Magnesium   Result Value Ref Range    Magnesium 1.3 (L) 1.6 - 2.6 mg/dL   Brain Natriuretic Peptide   Result Value Ref Range    Pro-BNP 34 0 - 125 pg/mL   Protime-INR   Result Value Ref Range    Protime 12.6 (H) 9.3 - 12.4 sec    INR 1.1    APTT   Result Value Ref Range    aPTT 25.3 24.5 - 35.1 sec   Urinalysis, reflex to microscopic   Result Value Ref Range    Color, UA Yellow Straw/Yellow    Clarity, UA Clear Clear    Glucose, Ur Negative Negative mg/dL    Bilirubin Urine Negative Negative    Ketones, Urine Negative Negative mg/dL    Specific Gravity, UA 1.015 1.005 - 1.030    Blood, Urine MODERATE (A) Negative    pH, UA 5.5 5.0 - 9.0    Protein, UA Negative Negative mg/dL    Urobilinogen, Urine 0.2 <2.0 E.U./dL    Nitrite, Urine Negative Negative    Leukocyte Esterase, Urine Negative Negative   Lactate, Sepsis   Result Value Ref Range    Lactic Acid, Sepsis 3.9 (HH) 0.5 - 1.9 mmol/L   Lipase   Result Value Ref Range    Lipase 20 13 - 60 U/L   Pregnancy, urine   Result Value Ref Range    HCG(Urine) Pregnancy Test NEGATIVE NEGATIVE   Microscopic Urinalysis   Result Value Ref Range    WBC, UA NONE 0 - 5 /HPF    RBC, UA 0-1 0 - 2 /HPF    Epithelial Cells, UA RARE /HPF    Bacteria, UA FEW (A) None Seen /HPF    Amorphous, UA RARE    Lactate, Sepsis   Result Value Ref Range    Lactic Acid, Sepsis 2.3 (H) 0.5 - 1.9 mmol/L   EKG 12 Lead   Result Value Ref Range    Ventricular Rate 110 BPM    Atrial Rate 110 BPM    P-R Interval 126 ms    QRS Duration 84 ms    Q-T Interval 318 ms    QTc Calculation (Bazett) 430 ms    P Axis 46 degrees    R Axis 44 degrees    T Axis 32 degrees     EKG read interpreted by me. Heart rate 110. Sinus tachycardia. Normal axis deviation. No QTC prolongation. No ST elevations or depressions. No previous EKG to compare to. Radiology:  CT ABDOMEN PELVIS W IV CONTRAST Additional Contrast? None   Final Result   1. No acute abnormality is seen in the abdomen or the pelvis. 2. Mild hepatomegaly with steatosis.             ------------------------- NURSING NOTES AND VITALS REVIEWED ---------------------------  Date / Time Roomed:  9/10/2021 11:23 AM  ED Bed Assignment:  19/19    The nursing notes within the ED encounter and vital signs as below have been reviewed.    BP (!) 145/86   Pulse 109   Temp 99.8 °F (37.7 °C) (Oral)   Resp 22   SpO2 97%   Oxygen Saturation Interpretation: Normal      ------------------------------------------ PROGRESS NOTES ------------------------------------------  4:51 AM EDT  I have spoken with the patient and discussed todays results, in addition to providing specific details for the plan of care and counseling regarding the diagnosis and prognosis. Their questions are answered at this time and they are agreeable with the plan. I discussed at length with them reasons for immediate return here for re evaluation. They will followup with their primary care physician by calling their office As soon as she is able. .      --------------------------------- ADDITIONAL PROVIDER NOTES ---------------------------------  At this time the patient is without objective evidence of an acute process requiring hospitalization or inpatient management. They have remained hemodynamically stable throughout their entire ED visit and are stable for discharge with outpatient follow-up. The plan has been discussed in detail and they are aware of the specific conditions for emergent return, as well as the importance of follow-up. Discharge Medication List as of 9/10/2021  5:50 PM      START taking these medications    Details   HYDROcodone-acetaminophen (NORCO) 5-325 MG per tablet Take 1 tablet by mouth every 4 hours as needed for Pain for up to 3 days. Intended supply: 3 days. Take lowest dose possible to manage pain, Disp-18 tablet, R-0Print      ondansetron (ZOFRAN ODT) 4 MG disintegrating tablet Take 1 tablet by mouth every 12 hours as needed for Nausea or Vomiting, Disp-4 tablet, R-0Print             Diagnosis:  1. Generalized abdominal pain    2. Nausea and vomiting, intractability of vomiting not specified, unspecified vomiting type        Disposition:  Patient's disposition: Discharge to home  Patient's condition is stable.        Guido Wilson MD  Resident  09/11/21 [id number]

## 2021-09-11 ENCOUNTER — HOSPITAL ENCOUNTER (INPATIENT)
Age: 41
LOS: 3 days | Discharge: HOME | DRG: 690 | End: 2021-09-14
Attending: EMERGENCY MEDICINE | Admitting: INTERNAL MEDICINE
Payer: COMMERCIAL

## 2021-09-11 DIAGNOSIS — R78.81 BACTEREMIA: Primary | ICD-10-CM

## 2021-09-11 DIAGNOSIS — N30.01 ACUTE CYSTITIS WITH HEMATURIA: ICD-10-CM

## 2021-09-11 LAB
ALBUMIN SERPL-MCNC: 2.9 G/DL (ref 3.5–5.2)
ALP BLD-CCNC: 84 U/L (ref 35–104)
ALT SERPL-CCNC: 127 U/L (ref 0–32)
ANION GAP SERPL CALCULATED.3IONS-SCNC: 8 MMOL/L (ref 7–16)
AST SERPL-CCNC: 77 U/L (ref 0–31)
BACTERIA: ABNORMAL /HPF
BASOPHILS ABSOLUTE: 0 E9/L (ref 0–0.2)
BASOPHILS RELATIVE PERCENT: 0.1 % (ref 0–2)
BILIRUB SERPL-MCNC: 0.9 MG/DL (ref 0–1.2)
BILIRUBIN URINE: ABNORMAL
BLOOD, URINE: ABNORMAL
BOTTLE TYPE: ABNORMAL
BUN BLDV-MCNC: 9 MG/DL (ref 6–20)
BURR CELLS: ABNORMAL
CALCIUM SERPL-MCNC: 8.3 MG/DL (ref 8.6–10.2)
CANDIDA ALBICANS BY PCR: NOT DETECTED
CANDIDA GLABRATA BY PCR: NOT DETECTED
CANDIDA KRUSEI BY PCR: NOT DETECTED
CANDIDA PARAPSILOSIS BY PCR: NOT DETECTED
CANDIDA TROPICALIS BY PCR: NOT DETECTED
CARBAPENEM RESISTANCE KPC BY PCR: NOT DETECTED
CHLORIDE BLD-SCNC: 100 MMOL/L (ref 98–107)
CLARITY: ABNORMAL
CO2: 25 MMOL/L (ref 22–29)
COLOR: ABNORMAL
CREAT SERPL-MCNC: 0.8 MG/DL (ref 0.5–1)
ENTEROBACTER CLOACAE COMPLEX BY PCR: NOT DETECTED
ENTEROBACTERALES BY PCR: DETECTED
EOSINOPHILS ABSOLUTE: 0.07 E9/L (ref 0.05–0.5)
EOSINOPHILS RELATIVE PERCENT: 0.9 % (ref 0–6)
EPITHELIAL CELLS, UA: ABNORMAL /HPF
ESCHERICHIA COLI BY PCR: DETECTED
GFR AFRICAN AMERICAN: >60
GFR NON-AFRICAN AMERICAN: >60 ML/MIN/1.73
GLUCOSE BLD-MCNC: 143 MG/DL (ref 74–99)
GLUCOSE URINE: NEGATIVE MG/DL
HAEMOPHILUS INFLUENZAE BY PCR: NOT DETECTED
HCT VFR BLD CALC: 36.3 % (ref 34–48)
HEMOGLOBIN: 12 G/DL (ref 11.5–15.5)
KETONES, URINE: NEGATIVE MG/DL
KLEBSIELLA OXYTOCA BY PCR: NOT DETECTED
KLEBSIELLA PNEUMONIAE GROUP BY PCR: NOT DETECTED
LACTIC ACID: 1.4 MMOL/L (ref 0.5–2.2)
LEUKOCYTE ESTERASE, URINE: ABNORMAL
LIPASE: 13 U/L (ref 13–60)
LISTERIA MONOCYTOGENES BY PCR: NOT DETECTED
LYMPHOCYTES ABSOLUTE: 0.16 E9/L (ref 1.5–4)
LYMPHOCYTES RELATIVE PERCENT: 1.7 % (ref 20–42)
MCH RBC QN AUTO: 30.8 PG (ref 26–35)
MCHC RBC AUTO-ENTMCNC: 33.1 % (ref 32–34.5)
MCV RBC AUTO: 93.1 FL (ref 80–99.9)
MONOCYTES ABSOLUTE: 0.16 E9/L (ref 0.1–0.95)
MONOCYTES RELATIVE PERCENT: 1.7 % (ref 2–12)
NEISSERIA MENINGITIDIS BY PCR: NOT DETECTED
NEUTROPHILS ABSOLUTE: 7.68 E9/L (ref 1.8–7.3)
NEUTROPHILS RELATIVE PERCENT: 95.7 % (ref 43–80)
NITRITE, URINE: NEGATIVE
ORDER NUMBER: ABNORMAL
OVALOCYTES: ABNORMAL
PDW BLD-RTO: 13.8 FL (ref 11.5–15)
PH UA: 6.5 (ref 5–9)
PLATELET # BLD: 188 E9/L (ref 130–450)
PMV BLD AUTO: 10.1 FL (ref 7–12)
POIKILOCYTES: ABNORMAL
POTASSIUM SERPL-SCNC: 3.9 MMOL/L (ref 3.5–5)
PROTEIN UA: 100 MG/DL
PROTEUS SPECIES BY PCR: NOT DETECTED
PSEUDOMONAS AERUGINOSA BY PCR: NOT DETECTED
RBC # BLD: 3.9 E12/L (ref 3.5–5.5)
RBC UA: >20 /HPF (ref 0–2)
SERRATIA MARCESCENS BY PCR: NOT DETECTED
SODIUM BLD-SCNC: 133 MMOL/L (ref 132–146)
SOURCE OF BLOOD CULTURE: ABNORMAL
SPECIFIC GRAVITY UA: 1.02 (ref 1–1.03)
STAPHYLOCOCCUS AUREUS BY PCR: NOT DETECTED
STAPHYLOCOCCUS SPECIES BY PCR: NOT DETECTED
STREPTOCOCCUS AGALACTIAE BY PCR: NOT DETECTED
STREPTOCOCCUS PNEUMONIAE BY PCR: NOT DETECTED
STREPTOCOCCUS PYOGENES  BY PCR: NOT DETECTED
STREPTOCOCCUS SPECIES BY PCR: NOT DETECTED
TOTAL PROTEIN: 6.5 G/DL (ref 6.4–8.3)
UROBILINOGEN, URINE: 2 E.U./DL
WBC # BLD: 8 E9/L (ref 4.5–11.5)
WBC UA: ABNORMAL /HPF (ref 0–5)

## 2021-09-11 PROCEDURE — 83690 ASSAY OF LIPASE: CPT

## 2021-09-11 PROCEDURE — 87088 URINE BACTERIA CULTURE: CPT

## 2021-09-11 PROCEDURE — 81001 URINALYSIS AUTO W/SCOPE: CPT

## 2021-09-11 PROCEDURE — 1200000000 HC SEMI PRIVATE

## 2021-09-11 PROCEDURE — 99283 EMERGENCY DEPT VISIT LOW MDM: CPT

## 2021-09-11 PROCEDURE — 80053 COMPREHEN METABOLIC PANEL: CPT

## 2021-09-11 PROCEDURE — 2580000003 HC RX 258: Performed by: NURSE PRACTITIONER

## 2021-09-11 PROCEDURE — 87040 BLOOD CULTURE FOR BACTERIA: CPT

## 2021-09-11 PROCEDURE — 85025 COMPLETE CBC W/AUTO DIFF WBC: CPT

## 2021-09-11 PROCEDURE — 2580000003 HC RX 258: Performed by: INTERNAL MEDICINE

## 2021-09-11 PROCEDURE — 99223 1ST HOSP IP/OBS HIGH 75: CPT | Performed by: INTERNAL MEDICINE

## 2021-09-11 PROCEDURE — 83605 ASSAY OF LACTIC ACID: CPT

## 2021-09-11 PROCEDURE — 6360000002 HC RX W HCPCS: Performed by: NURSE PRACTITIONER

## 2021-09-11 RX ORDER — ACETAMINOPHEN 650 MG/1
650 SUPPOSITORY RECTAL EVERY 6 HOURS PRN
Status: DISCONTINUED | OUTPATIENT
Start: 2021-09-11 | End: 2021-09-14 | Stop reason: HOSPADM

## 2021-09-11 RX ORDER — POLYETHYLENE GLYCOL 3350 17 G/17G
17 POWDER, FOR SOLUTION ORAL DAILY PRN
Status: DISCONTINUED | OUTPATIENT
Start: 2021-09-11 | End: 2021-09-14 | Stop reason: HOSPADM

## 2021-09-11 RX ORDER — PROMETHAZINE HYDROCHLORIDE 25 MG/1
12.5 TABLET ORAL EVERY 6 HOURS PRN
Status: DISCONTINUED | OUTPATIENT
Start: 2021-09-11 | End: 2021-09-14 | Stop reason: HOSPADM

## 2021-09-11 RX ORDER — SODIUM CHLORIDE 0.9 % (FLUSH) 0.9 %
10 SYRINGE (ML) INJECTION PRN
Status: DISCONTINUED | OUTPATIENT
Start: 2021-09-11 | End: 2021-09-14 | Stop reason: HOSPADM

## 2021-09-11 RX ORDER — ACETAMINOPHEN 325 MG/1
650 TABLET ORAL EVERY 6 HOURS PRN
Status: DISCONTINUED | OUTPATIENT
Start: 2021-09-11 | End: 2021-09-14 | Stop reason: HOSPADM

## 2021-09-11 RX ORDER — SODIUM CHLORIDE 9 MG/ML
25 INJECTION, SOLUTION INTRAVENOUS PRN
Status: DISCONTINUED | OUTPATIENT
Start: 2021-09-11 | End: 2021-09-14 | Stop reason: HOSPADM

## 2021-09-11 RX ORDER — SODIUM CHLORIDE 0.9 % (FLUSH) 0.9 %
10 SYRINGE (ML) INJECTION EVERY 12 HOURS SCHEDULED
Status: DISCONTINUED | OUTPATIENT
Start: 2021-09-11 | End: 2021-09-14 | Stop reason: HOSPADM

## 2021-09-11 RX ORDER — ONDANSETRON 2 MG/ML
4 INJECTION INTRAMUSCULAR; INTRAVENOUS EVERY 6 HOURS PRN
Status: DISCONTINUED | OUTPATIENT
Start: 2021-09-11 | End: 2021-09-14 | Stop reason: HOSPADM

## 2021-09-11 RX ADMIN — SODIUM CHLORIDE, PRESERVATIVE FREE 10 ML: 5 INJECTION INTRAVENOUS at 22:07

## 2021-09-11 RX ADMIN — WATER 2000 MG: 1 INJECTION INTRAMUSCULAR; INTRAVENOUS; SUBCUTANEOUS at 22:05

## 2021-09-11 ASSESSMENT — ENCOUNTER SYMPTOMS
SINUS PRESSURE: 0
WHEEZING: 0
ABDOMINAL DISTENTION: 0
VOMITING: 0
BACK PAIN: 0
SORE THROAT: 0
CONSTIPATION: 0
SHORTNESS OF BREATH: 0
COLOR CHANGE: 0
NAUSEA: 0
DIARRHEA: 0
ABDOMINAL PAIN: 1
COUGH: 0

## 2021-09-11 ASSESSMENT — PAIN DESCRIPTION - ORIENTATION: ORIENTATION: RIGHT

## 2021-09-11 ASSESSMENT — PAIN DESCRIPTION - PAIN TYPE: TYPE: ACUTE PAIN

## 2021-09-11 ASSESSMENT — PAIN DESCRIPTION - FREQUENCY: FREQUENCY: INTERMITTENT

## 2021-09-11 ASSESSMENT — PAIN DESCRIPTION - DESCRIPTORS: DESCRIPTORS: ACHING

## 2021-09-11 ASSESSMENT — PAIN DESCRIPTION - LOCATION: LOCATION: ABDOMEN

## 2021-09-11 ASSESSMENT — PAIN SCALES - GENERAL: PAINLEVEL_OUTOF10: 6

## 2021-09-11 NOTE — Clinical Note
Patient Class: Observation [104]   REQUIRED: Diagnosis: Bacteremia [790. 7. ICD-9-CM]   Estimated Length of Stay: Estimated stay of less than 2 midnights   Admitting Provider: Elias Linares [[id number]]   Telemetry/Cardiac Monitoring Required?: No

## 2021-09-11 NOTE — PROGRESS NOTES
Discussed blood culture results with patient, recommending she return to the ED for IV antibiotics and potential admission.  Patient agrees and will return    Erin Houston PharmD, BCPS 9/11/2021 5:52 PM   [phone]

## 2021-09-12 ENCOUNTER — APPOINTMENT (OUTPATIENT)
Dept: CT IMAGING | Age: 41
DRG: 690 | End: 2021-09-12
Payer: COMMERCIAL

## 2021-09-12 ENCOUNTER — APPOINTMENT (OUTPATIENT)
Dept: GENERAL RADIOLOGY | Age: 41
DRG: 690 | End: 2021-09-12
Payer: COMMERCIAL

## 2021-09-12 ENCOUNTER — APPOINTMENT (OUTPATIENT)
Dept: ULTRASOUND IMAGING | Age: 41
DRG: 690 | End: 2021-09-12
Payer: COMMERCIAL

## 2021-09-12 LAB
ADENOVIRUS BY PCR: NOT DETECTED
ALBUMIN SERPL-MCNC: 3.1 G/DL (ref 3.5–5.2)
ALP BLD-CCNC: 89 U/L (ref 35–104)
ALT SERPL-CCNC: 113 U/L (ref 0–32)
ANION GAP SERPL CALCULATED.3IONS-SCNC: 6 MMOL/L (ref 7–16)
AST SERPL-CCNC: 64 U/L (ref 0–31)
BASOPHILS ABSOLUTE: 0.02 E9/L (ref 0–0.2)
BASOPHILS RELATIVE PERCENT: 0.3 % (ref 0–2)
BILIRUB SERPL-MCNC: 0.7 MG/DL (ref 0–1.2)
BILIRUBIN DIRECT: 0.3 MG/DL (ref 0–0.3)
BILIRUBIN, INDIRECT: 0.4 MG/DL (ref 0–1)
BORDETELLA PARAPERTUSSIS BY PCR: NOT DETECTED
BORDETELLA PERTUSSIS BY PCR: NOT DETECTED
BUN BLDV-MCNC: 8 MG/DL (ref 6–20)
CALCIUM SERPL-MCNC: 8.4 MG/DL (ref 8.6–10.2)
CHLAMYDOPHILIA PNEUMONIAE BY PCR: NOT DETECTED
CHLORIDE BLD-SCNC: 104 MMOL/L (ref 98–107)
CO2: 26 MMOL/L (ref 22–29)
CORONAVIRUS 229E BY PCR: NOT DETECTED
CORONAVIRUS HKU1 BY PCR: NOT DETECTED
CORONAVIRUS NL63 BY PCR: NOT DETECTED
CORONAVIRUS OC43 BY PCR: NOT DETECTED
CREAT SERPL-MCNC: 0.7 MG/DL (ref 0.5–1)
EOSINOPHILS ABSOLUTE: 0.07 E9/L (ref 0.05–0.5)
EOSINOPHILS RELATIVE PERCENT: 0.9 % (ref 0–6)
GFR AFRICAN AMERICAN: >60
GFR NON-AFRICAN AMERICAN: >60 ML/MIN/1.73
GLUCOSE BLD-MCNC: 135 MG/DL (ref 74–99)
HBA1C MFR BLD: 6.7 % (ref 4–5.6)
HCT VFR BLD CALC: 35 % (ref 34–48)
HEMOGLOBIN: 11.6 G/DL (ref 11.5–15.5)
HUMAN METAPNEUMOVIRUS BY PCR: NOT DETECTED
HUMAN RHINOVIRUS/ENTEROVIRUS BY PCR: NOT DETECTED
IMMATURE GRANULOCYTES #: 0.03 E9/L
IMMATURE GRANULOCYTES %: 0.4 % (ref 0–5)
INFLUENZA A BY PCR: NOT DETECTED
INFLUENZA B BY PCR: NOT DETECTED
LIPASE: 14 U/L (ref 13–60)
LV EF: 58 %
LVEF MODALITY: NORMAL
LYMPHOCYTES ABSOLUTE: 0.86 E9/L (ref 1.5–4)
LYMPHOCYTES RELATIVE PERCENT: 11.2 % (ref 20–42)
MCH RBC QN AUTO: 30.4 PG (ref 26–35)
MCHC RBC AUTO-ENTMCNC: 33.1 % (ref 32–34.5)
MCV RBC AUTO: 91.9 FL (ref 80–99.9)
MONOCYTES ABSOLUTE: 0.51 E9/L (ref 0.1–0.95)
MONOCYTES RELATIVE PERCENT: 6.6 % (ref 2–12)
MYCOPLASMA PNEUMONIAE BY PCR: NOT DETECTED
NEUTROPHILS ABSOLUTE: 6.21 E9/L (ref 1.8–7.3)
NEUTROPHILS RELATIVE PERCENT: 80.6 % (ref 43–80)
PARAINFLUENZA VIRUS 1 BY PCR: NOT DETECTED
PARAINFLUENZA VIRUS 2 BY PCR: NOT DETECTED
PARAINFLUENZA VIRUS 3 BY PCR: NOT DETECTED
PARAINFLUENZA VIRUS 4 BY PCR: NOT DETECTED
PDW BLD-RTO: 13.6 FL (ref 11.5–15)
PLATELET # BLD: 181 E9/L (ref 130–450)
PMV BLD AUTO: 10.4 FL (ref 7–12)
POTASSIUM REFLEX MAGNESIUM: 3.9 MMOL/L (ref 3.5–5)
PROCALCITONIN: 9.76 NG/ML (ref 0–0.08)
RBC # BLD: 3.81 E12/L (ref 3.5–5.5)
RESPIRATORY SYNCYTIAL VIRUS BY PCR: NOT DETECTED
SARS-COV-2, PCR: NOT DETECTED
SODIUM BLD-SCNC: 136 MMOL/L (ref 132–146)
TOTAL CK: 229 U/L (ref 20–180)
TOTAL PROTEIN: 6.4 G/DL (ref 6.4–8.3)
URINE CULTURE, ROUTINE: NORMAL
WBC # BLD: 7.7 E9/L (ref 4.5–11.5)

## 2021-09-12 PROCEDURE — 83036 HEMOGLOBIN GLYCOSYLATED A1C: CPT

## 2021-09-12 PROCEDURE — 76775 US EXAM ABDO BACK WALL LIM: CPT

## 2021-09-12 PROCEDURE — 94660 CPAP INITIATION&MGMT: CPT

## 2021-09-12 PROCEDURE — 2580000003 HC RX 258: Performed by: NURSE PRACTITIONER

## 2021-09-12 PROCEDURE — 94640 AIRWAY INHALATION TREATMENT: CPT

## 2021-09-12 PROCEDURE — 2580000003 HC RX 258: Performed by: INTERNAL MEDICINE

## 2021-09-12 PROCEDURE — APPSS30 APP SPLIT SHARED TIME 16-30 MINUTES: Performed by: NURSE PRACTITIONER

## 2021-09-12 PROCEDURE — 76705 ECHO EXAM OF ABDOMEN: CPT

## 2021-09-12 PROCEDURE — 6370000000 HC RX 637 (ALT 250 FOR IP): Performed by: NURSE PRACTITIONER

## 2021-09-12 PROCEDURE — 80053 COMPREHEN METABOLIC PANEL: CPT

## 2021-09-12 PROCEDURE — 82247 BILIRUBIN TOTAL: CPT

## 2021-09-12 PROCEDURE — 36415 COLL VENOUS BLD VENIPUNCTURE: CPT

## 2021-09-12 PROCEDURE — 83690 ASSAY OF LIPASE: CPT

## 2021-09-12 PROCEDURE — 1200000000 HC SEMI PRIVATE

## 2021-09-12 PROCEDURE — 6360000002 HC RX W HCPCS: Performed by: NURSE PRACTITIONER

## 2021-09-12 PROCEDURE — 0202U NFCT DS 22 TRGT SARS-COV-2: CPT

## 2021-09-12 PROCEDURE — 6360000002 HC RX W HCPCS: Performed by: INTERNAL MEDICINE

## 2021-09-12 PROCEDURE — 71250 CT THORAX DX C-: CPT

## 2021-09-12 PROCEDURE — 85025 COMPLETE CBC W/AUTO DIFF WBC: CPT

## 2021-09-12 PROCEDURE — 82550 ASSAY OF CK (CPK): CPT

## 2021-09-12 PROCEDURE — 93306 TTE W/DOPPLER COMPLETE: CPT

## 2021-09-12 PROCEDURE — 6370000000 HC RX 637 (ALT 250 FOR IP): Performed by: INTERNAL MEDICINE

## 2021-09-12 PROCEDURE — 99233 SBSQ HOSP IP/OBS HIGH 50: CPT | Performed by: INTERNAL MEDICINE

## 2021-09-12 PROCEDURE — 82248 BILIRUBIN DIRECT: CPT

## 2021-09-12 PROCEDURE — 84145 PROCALCITONIN (PCT): CPT

## 2021-09-12 PROCEDURE — 94664 DEMO&/EVAL PT USE INHALER: CPT

## 2021-09-12 PROCEDURE — 71046 X-RAY EXAM CHEST 2 VIEWS: CPT

## 2021-09-12 RX ORDER — IPRATROPIUM BROMIDE AND ALBUTEROL SULFATE 2.5; .5 MG/3ML; MG/3ML
1 SOLUTION RESPIRATORY (INHALATION)
Status: DISCONTINUED | OUTPATIENT
Start: 2021-09-12 | End: 2021-09-14 | Stop reason: HOSPADM

## 2021-09-12 RX ORDER — HYDROCODONE BITARTRATE AND ACETAMINOPHEN 5; 325 MG/1; MG/1
1 TABLET ORAL EVERY 4 HOURS PRN
Status: DISCONTINUED | OUTPATIENT
Start: 2021-09-12 | End: 2021-09-14 | Stop reason: HOSPADM

## 2021-09-12 RX ORDER — SODIUM CHLORIDE 9 MG/ML
INJECTION, SOLUTION INTRAVENOUS CONTINUOUS
Status: DISCONTINUED | OUTPATIENT
Start: 2021-09-12 | End: 2021-09-13

## 2021-09-12 RX ORDER — BUDESONIDE 0.5 MG/2ML
500 INHALANT ORAL 2 TIMES DAILY
Status: DISCONTINUED | OUTPATIENT
Start: 2021-09-12 | End: 2021-09-14 | Stop reason: HOSPADM

## 2021-09-12 RX ORDER — GUAIFENESIN 100 MG/5ML
200 SOLUTION ORAL EVERY 4 HOURS PRN
Status: DISCONTINUED | OUTPATIENT
Start: 2021-09-12 | End: 2021-09-14 | Stop reason: HOSPADM

## 2021-09-12 RX ORDER — ALBUTEROL SULFATE 0.63 MG/3ML
1 SOLUTION RESPIRATORY (INHALATION) EVERY 6 HOURS PRN
Status: DISCONTINUED | OUTPATIENT
Start: 2021-09-12 | End: 2021-09-14 | Stop reason: HOSPADM

## 2021-09-12 RX ORDER — PREDNISONE 20 MG/1
40 TABLET ORAL DAILY
Status: DISCONTINUED | OUTPATIENT
Start: 2021-09-12 | End: 2021-09-14 | Stop reason: HOSPADM

## 2021-09-12 RX ORDER — PANTOPRAZOLE SODIUM 40 MG/1
40 TABLET, DELAYED RELEASE ORAL
Status: DISCONTINUED | OUTPATIENT
Start: 2021-09-13 | End: 2021-09-14 | Stop reason: HOSPADM

## 2021-09-12 RX ADMIN — ACETAMINOPHEN 650 MG: 325 TABLET ORAL at 00:11

## 2021-09-12 RX ADMIN — HYDROCODONE BITARTRATE AND ACETAMINOPHEN 1 TABLET: 5; 325 TABLET ORAL at 09:11

## 2021-09-12 RX ADMIN — GUAIFENESIN 200 MG: 200 SOLUTION ORAL at 22:31

## 2021-09-12 RX ADMIN — IPRATROPIUM BROMIDE AND ALBUTEROL SULFATE 1 AMPULE: .5; 3 SOLUTION RESPIRATORY (INHALATION) at 13:58

## 2021-09-12 RX ADMIN — HYDROCODONE BITARTRATE AND ACETAMINOPHEN 1 TABLET: 5; 325 TABLET ORAL at 18:45

## 2021-09-12 RX ADMIN — SODIUM CHLORIDE: 9 INJECTION, SOLUTION INTRAVENOUS at 13:06

## 2021-09-12 RX ADMIN — BUDESONIDE 500 MCG: 0.5 INHALANT RESPIRATORY (INHALATION) at 19:04

## 2021-09-12 RX ADMIN — GUAIFENESIN 200 MG: 200 SOLUTION ORAL at 18:07

## 2021-09-12 RX ADMIN — WATER 2000 MG: 1 INJECTION INTRAMUSCULAR; INTRAVENOUS; SUBCUTANEOUS at 22:09

## 2021-09-12 RX ADMIN — ALBUTEROL SULFATE 0.63 MG: 0.63 SOLUTION RESPIRATORY (INHALATION) at 07:46

## 2021-09-12 RX ADMIN — IPRATROPIUM BROMIDE AND ALBUTEROL SULFATE 1 AMPULE: .5; 3 SOLUTION RESPIRATORY (INHALATION) at 19:04

## 2021-09-12 RX ADMIN — HYDROCODONE BITARTRATE AND ACETAMINOPHEN 1 TABLET: 5; 325 TABLET ORAL at 23:22

## 2021-09-12 RX ADMIN — PREDNISONE 40 MG: 20 TABLET ORAL at 18:07

## 2021-09-12 RX ADMIN — HYDROCODONE BITARTRATE AND ACETAMINOPHEN 1 TABLET: 5; 325 TABLET ORAL at 02:48

## 2021-09-12 ASSESSMENT — PAIN DESCRIPTION - DESCRIPTORS
DESCRIPTORS: CRAMPING
DESCRIPTORS: DISCOMFORT;CRAMPING;DULL

## 2021-09-12 ASSESSMENT — PAIN DESCRIPTION - LOCATION
LOCATION: ABDOMEN
LOCATION: ABDOMEN

## 2021-09-12 ASSESSMENT — PAIN SCALES - GENERAL
PAINLEVEL_OUTOF10: 3
PAINLEVEL_OUTOF10: 7
PAINLEVEL_OUTOF10: 3
PAINLEVEL_OUTOF10: 8
PAINLEVEL_OUTOF10: 7
PAINLEVEL_OUTOF10: 7

## 2021-09-12 ASSESSMENT — ENCOUNTER SYMPTOMS: TACHYPNEA: 1

## 2021-09-12 ASSESSMENT — PAIN DESCRIPTION - PAIN TYPE
TYPE: SURGICAL PAIN
TYPE: ACUTE PAIN

## 2021-09-12 ASSESSMENT — PAIN DESCRIPTION - FREQUENCY: FREQUENCY: INTERMITTENT

## 2021-09-12 NOTE — PROGRESS NOTES
CREATININE 0.8 0.8 0.7   GLUCOSE 118* 143* 135*   CALCIUM 9.0 8.3* 8.4*       Recent Labs     09/10/21  1237 09/11/21 2203 09/12/21  0437   ALKPHOS 106* 84 89   PROT 6.9 6.5 6.4   LABALBU 3.6 2.9* 3.1*   BILITOT 1.0 0.9 0.7   AST 36* 77* 64*   ALT 50* 127* 113*       Recent Labs     09/10/21  1237 09/11/21 2203 09/12/21  0437   WBC 6.1 8.0 7.7   RBC 4.53 3.90 3.81   HGB 13.8 12.0 11.6   HCT 42.0 36.3 35.0   MCV 92.7 93.1 91.9   MCH 30.5 30.8 30.4   MCHC 32.9 33.1 33.1   RDW 13.5 13.8 13.6    188 181   MPV 10.0 10.1 10.4           Radiology:   XR CHEST (2 VW)   Final Result   1. Cardiomegaly. There are no gross findings of CHF or pneumonia. 2. The chest x-ray is under penetrated due to the patient's body habitus. If   there is high suspicion for COVID pneumonia dedicated CT of the thorax is   recommended. US ABDOMEN LIMITED    (Results Pending)   US RETROPERITONEAL LIMITED    (Results Pending)       Assessment:  Principal Problem:    Bacteremia  Active Problems:    Sleep apnea    Asthma  Resolved Problems:    * No resolved hospital problems. *      Plan:  1. Bacteremia: Blood cultures sent on ED visit on 9/10/21 are growing Gram negative rods. Repeat blood cultures are in process. Continue ceftriaxone. Check Echo.   2. Abdominal pain with elevated LFTs:  CT of the abdomen and pelvis that was done during the ED visit on 9/10 showed mild pancreatic steatosis, mildly enlarged liver. Lipase was checked and is 14. , AST 64. Check ultrasound of the abdomen. Add PPI. 3. Hematuria:  Large amount of blood seen on urinalysis. Patient reports that she has not had a period in several years since having an ablation done. Vaginal exam was done in the Emergency Department and no blood was seen in the vaginal vault. Check renal ultrasound. 4. Asthma:  Duonebs and pulmicort ordered. Check respiratory panel, procalcitonin and chest xray.    5. Sores/lesions to back and legs:  Patient reports that they started as pimples and she picked at them. 6. Sleep apnea:  Patient does not have her Cpap from home. Cpap ordered. 7. Elevated blood sugars:  Check Hgb A1C. NOTE: This report was transcribed using voice recognition software. Every effort was made to ensure accuracy; however, inadvertent computerized transcription errors may be present.      Electronically signed by LISA Eubanks CNP on 9/12/2021 at 11:06 AM

## 2021-09-12 NOTE — ED PROVIDER NOTES
ED Attending  CC: No    HPI:  21,   Time: 9:40 PM EDT         Pawel Krishnamurthy is a 46 y.o. female presenting to the ED for abnormal labs, beginning yesterday ago. The complaint has been persistent, mild in severity, and worsened by nothing. Patient presents with complaints stating that she received a phone call from this facility earlier today advising her of positive blood cultures and that she should return for admission for bacteremia. She was seen here yesterday for complaints of abdominal pain and was subsequently discharged home. She denies any fever, body aches or chills. Denies any chest pain or shortness of breath. She is complaining of some mild fatigue. ROS:   Pertinent positives and negatives are stated within HPI, all other systems reviewed and are negative.  --------------------------------------------- PAST HISTORY ---------------------------------------------  Past Medical History:  has a past medical history of Asthma and Sleep apnea. Past Surgical History:  has a past surgical history that includes Appendectomy;  section; Tubal ligation; Dilation and curettage of uterus; and pr hysteroscopy,w/endometrial ablation (N/A, 2018). Social History:  reports that she has never smoked. She has never used smokeless tobacco. She reports that she does not drink alcohol and does not use drugs. Family History: family history is not on file. The patients home medications have been reviewed. Allergies: Bactrim [sulfamethoxazole-trimethoprim], Amoxil [amoxicillin], Imitrex [sumatriptan], and Ultram [tramadol hcl]    -------------------------------------------------- RESULTS -------------------------------------------------  All laboratory and radiology results have been personally reviewed by myself   LABS:  No results found for this visit on 21.     RADIOLOGY:  Interpreted by Radiologist.  No orders to display       ------------------------- NURSING NOTES AND VITALS REVIEWED ---------------------------   The nursing notes within the ED encounter and vital signs as below have been reviewed. BP (!) 154/78   Pulse 96   Temp 98.1 °F (36.7 °C) (Temporal)   Resp 22   Wt (!) 412 lb (186.9 kg)   SpO2 96%   BMI 66.50 kg/m²   Oxygen Saturation Interpretation: Normal      ---------------------------------------------------PHYSICAL EXAM--------------------------------------      Constitutional/General: Alert and oriented x3, well appearing, non toxic in NAD  Head: NC/AT  Eyes: PERRL, EOMI  Mouth: Oropharynx clear, handling secretions, no trismus  Neck: Supple, full ROM, no meningeal signs  Pulmonary: Lungs clear to auscultation bilaterally, no wheezes, rales, or rhonchi. Not in respiratory distress  Cardiovascular:  Regular rate and rhythm, no murmurs, gallops, or rubs. 2+ distal pulses  Abdomen: Soft, non tender, non distended,   Extremities: Moves all extremities x 4. Warm and well perfused  Skin: warm and dry without rash  Neurologic: GCS 15,  Psych: Normal Affect      ------------------------------ ED COURSE/MEDICAL DECISION MAKING----------------------  Medications   cefTRIAXone (ROCEPHIN) 2,000 mg in sterile water 20 mL IV syringe (has no administration in time range)         Medical Decision Making:    Patient was examined by Dr. Cleve Alex made aware of plan and recommendation for admission IV antibiotics were initiated. Call was placed to Dr. Salvador Alatorre update on patient's clinical presentation physical exam diagnostic results admit the patient under his service to general [facility] with a diagnosis of bacteremia. Counseling: The emergency provider has spoken with the patient and discussed todays results, in addition to providing specific details for the plan of care and counseling regarding the diagnosis and prognosis.   Questions are answered at this time and they are agreeable with the plan.      --------------------------------- IMPRESSION AND DISPOSITION ---------------------------------    IMPRESSION  1.  Bacteremia        DISPOSITION  Disposition: Admit to med/surg floor  Patient condition is stable                 LISA Flores CNP  09/11/21 [id number]

## 2021-09-12 NOTE — H&P
for up to 3 days. Intended supply: 3 days. Take lowest dose possible to manage pain 9/10/21 9/13/21 Yes Rene Ruiz MD   ondansetron (ZOFRAN ODT) 4 MG disintegrating tablet Take 1 tablet by mouth every 12 hours as needed for Nausea or Vomiting 9/10/21 9/12/21 Yes Rene Ruiz MD   ketorolac (TORADOL) 10 MG tablet Take 1 tablet by mouth every 6 hours as needed for Pain 5/23/19   Sanchez Majano MD   albuterol sulfate  (90 Base) MCG/ACT inhaler Inhale 2 puffs into the lungs every 6 hours as needed for Wheezing    Historical Provider, MD   albuterol (ACCUNEB) 0.63 MG/3ML nebulizer solution Take 1 ampule by nebulization every 6 hours as needed for Wheezing    Historical Provider, MD       Allergies:    Bactrim [sulfamethoxazole-trimethoprim], Amoxil [amoxicillin], Imitrex [sumatriptan], Penicillins, and Ultram [tramadol hcl]    Social History:    reports that she has never smoked. She has never used smokeless tobacco. She reports current drug use. Frequency: 2.00 times per week. Drug: Marijuana. She reports that she does not drink alcohol. Family History:   family history includes Cancer in her mother; Diabetes in her mother; Heart Disease in her father; High Blood Pressure in her mother; Other in her sister. PHYSICAL EXAM:  Vitals:  /88   Pulse 106   Temp 100.4 °F (38 °C) (Oral)   Resp 22   Wt (!) 412 lb (186.9 kg)   SpO2 95%   BMI 66.50 kg/m²   GENERAL: No acute distress, Alert and awake, Afebrile, Appears tired and weak otherwise hemodynamically stable at present. HEENT: PERRLA, no icterus. OP clear and no exudates. NECK: Supple  no carotid/ophthalmic bruits, JVD None. RESPIRATORY:  Bilateral equal vesicular breath sound with no wheezing. Lung bases are clear. HEART: No tachycardia at bedside and regular rhythm. Normal S1 and S2, No S3 or S4 is audible. No pulsation, thrills, murmur or friction rubs. ABDOMEN: Soft, nondistended, nontender.  No hepatomegaly or splenomegaly. No CVA tenderness on the both sides. Bowel sound is present. EXTREMITIES: All peripheral pulses are present. No calf tenderness or swelling. No pedal edema is present. Jayshree Larsen NEUROLOGY: Alert and awake. No new focal neuro deficit. Bilateral Pupil is equal and reactive to light. CN-ii-xii otherwise grossly intact. Motor and Sensory: Grossly Intact bilaterally with no new focal signs     LABS:  Recent Labs     09/10/21  1237 09/11/21 2203 09/12/21  0437   WBC 6.1 8.0 7.7   RBC 4.53 3.90 3.81   HGB 13.8 12.0 11.6   HCT 42.0 36.3 35.0   MCV 92.7 93.1 91.9   MCH 30.5 30.8 30.4   MCHC 32.9 33.1 33.1   RDW 13.5 13.8 13.6    188 181   MPV 10.0 10.1 10.4     Recent Labs     09/10/21  1237 09/11/21 2203 09/12/21  0437    133 136   K 3.8 3.9 3.9    100 104   CO2 23 25 26   BUN 11 9 8   CREATININE 0.8 0.8 0.7   GLUCOSE 118* 143* 135*   CALCIUM 9.0 8.3* 8.4*     No results for input(s): POCGLU in the last 72 hours.   Results for orders placed or performed during the hospital encounter of 09/11/21   CBC Auto Differential   Result Value Ref Range    WBC 8.0 4.5 - 11.5 E9/L    RBC 3.90 3.50 - 5.50 E12/L    Hemoglobin 12.0 11.5 - 15.5 g/dL    Hematocrit 36.3 34.0 - 48.0 %    MCV 93.1 80.0 - 99.9 fL    MCH 30.8 26.0 - 35.0 pg    MCHC 33.1 32.0 - 34.5 %    RDW 13.8 11.5 - 15.0 fL    Platelets 005 134 - 980 E9/L    MPV 10.1 7.0 - 12.0 fL    Neutrophils % 95.7 (H) 43.0 - 80.0 %    Lymphocytes % 1.7 (L) 20.0 - 42.0 %    Monocytes % 1.7 (L) 2.0 - 12.0 %    Eosinophils % 0.9 0.0 - 6.0 %    Basophils % 0.1 0.0 - 2.0 %    Neutrophils Absolute 7.68 (H) 1.80 - 7.30 E9/L    Lymphocytes Absolute 0.16 (L) 1.50 - 4.00 E9/L    Monocytes Absolute 0.16 0.10 - 0.95 E9/L    Eosinophils Absolute 0.07 0.05 - 0.50 E9/L    Basophils Absolute 0.00 0.00 - 0.20 E9/L    Poikilocytes 1+     [place] Cells 1+     Ovalocytes 1+    Comprehensive Metabolic Panel   Result Value Ref Range    Sodium 133 132 - 146 mmol/L    Potassium 3.9 3.5 - 5.0 mmol/L    Chloride 100 98 - 107 mmol/L    CO2 25 22 - 29 mmol/L    Anion Gap 8 7 - 16 mmol/L    Glucose 143 (H) 74 - 99 mg/dL    BUN 9 6 - 20 mg/dL    CREATININE 0.8 0.5 - 1.0 mg/dL    GFR Non-African American >60 >=60 mL/min/1.73    GFR African American >60     Calcium 8.3 (L) 8.6 - 10.2 mg/dL    Total Protein 6.5 6.4 - 8.3 g/dL    Albumin 2.9 (L) 3.5 - 5.2 g/dL    Total Bilirubin 0.9 0.0 - 1.2 mg/dL    Alkaline Phosphatase 84 35 - 104 U/L     (H) 0 - 32 U/L    AST 77 (H) 0 - 31 U/L   Urinalysis   Result Value Ref Range    Color, UA DKYELLOW Straw/Yellow    Clarity, UA SLCLOUDY Clear    Glucose, Ur Negative Negative mg/dL    Bilirubin Urine SMALL (A) Negative    Ketones, Urine Negative Negative mg/dL    Specific Gravity, UA 1.020 1.005 - 1.030    Blood, Urine LARGE (A) Negative    pH, UA 6.5 5.0 - 9.0    Protein,  (A) Negative mg/dL    Urobilinogen, Urine 2.0 (A) <2.0 E.U./dL    Nitrite, Urine Negative Negative    Leukocyte Esterase, Urine TRACE (A) Negative   Lactic Acid, Plasma   Result Value Ref Range    Lactic Acid 1.4 0.5 - 2.2 mmol/L   Lipase   Result Value Ref Range    Lipase 13 13 - 60 U/L   Microscopic Urinalysis   Result Value Ref Range    WBC, UA 5-10 (A) 0 - 5 /HPF    RBC, UA >20 0 - 2 /HPF    Epithelial Cells, UA MODERATE /HPF    Bacteria, UA RARE (A) None Seen /HPF   CBC auto differential   Result Value Ref Range    WBC 7.7 4.5 - 11.5 E9/L    RBC 3.81 3.50 - 5.50 E12/L    Hemoglobin 11.6 11.5 - 15.5 g/dL    Hematocrit 35.0 34.0 - 48.0 %    MCV 91.9 80.0 - 99.9 fL    MCH 30.4 26.0 - 35.0 pg    MCHC 33.1 32.0 - 34.5 %    RDW 13.6 11.5 - 15.0 fL    Platelets 904 072 - 855 E9/L    MPV 10.4 7.0 - 12.0 fL    Neutrophils % 80.6 (H) 43.0 - 80.0 %    Immature Granulocytes % 0.4 0.0 - 5.0 %    Lymphocytes % 11.2 (L) 20.0 - 42.0 %    Monocytes % 6.6 2.0 - 12.0 %    Eosinophils % 0.9 0.0 - 6.0 %    Basophils % 0.3 0.0 - 2.0 %    Neutrophils Absolute 6.21 1.80 - 7.30 E9/L    Immature Granulocytes # 0.03 E9/L    Lymphocytes Absolute 0.86 (L) 1.50 - 4.00 E9/L    Monocytes Absolute 0.51 0.10 - 0.95 E9/L    Eosinophils Absolute 0.07 0.05 - 0.50 E9/L    Basophils Absolute 0.02 0.00 - 0.20 E9/L   Comprehensive Metabolic Panel w/ Reflex to MG   Result Value Ref Range    Sodium 136 132 - 146 mmol/L    Potassium reflex Magnesium 3.9 3.5 - 5.0 mmol/L    Chloride 104 98 - 107 mmol/L    CO2 26 22 - 29 mmol/L    Anion Gap 6 (L) 7 - 16 mmol/L    Glucose 135 (H) 74 - 99 mg/dL    BUN 8 6 - 20 mg/dL    CREATININE 0.7 0.5 - 1.0 mg/dL    GFR Non-African American >60 >=60 mL/min/1.73    GFR African American >60     Calcium 8.4 (L) 8.6 - 10.2 mg/dL    Total Protein 6.4 6.4 - 8.3 g/dL    Albumin 3.1 (L) 3.5 - 5.2 g/dL    Total Bilirubin 0.7 0.0 - 1.2 mg/dL    Alkaline Phosphatase 89 35 - 104 U/L     (H) 0 - 32 U/L    AST 64 (H) 0 - 31 U/L        Radiology: No results found. ASSESSMENT:    Present on Admission:   Bacteremia    PLAN:  # Abdominal pain - with Bacteremia   UA - no UTI   CXR clear   CT abdomen - unremarkable   Blood culture is growing gram negative rods   IV Ceftriaxone 2 gm IV   ID consult  # Rest of the chronic medical problems are stable and will be managed with appropriately with home medications, placed nursing communication order to verify home medications before giving them to the patient.   # Diet: On PO Diet  # IVF's: No  # Fall Precaution: Yes  # Disposition: Home/primary residence   # Code Status: Full code by default  # DVT Prophylaxis : SC Heparin or SC Lovenox as on chart  The patient at bedside was counseled about clinical status, laboratory/imaging results, diagnoses, medication side effects, risk, and treatment plan, all questions were answered to patient's satisfaction and verbalized understanding      SIGNATURE: Ryan Saenz MD PATIENT NAME: Graciela Whaley   CONTACT #: Hospitalist on call MRN: [id number]     Disclaimer: Portions of this note may have been generated using [employer] voice recognition software. Reasonable efforts were made to correct any dictation errors that resulted due to the programming of this software but some may still be present.

## 2021-09-13 PROBLEM — R73.09 ELEVATED HEMOGLOBIN A1C: Status: ACTIVE | Noted: 2021-09-13

## 2021-09-13 LAB
ANION GAP SERPL CALCULATED.3IONS-SCNC: 9 MMOL/L (ref 7–16)
BASOPHILS ABSOLUTE: 0.02 E9/L (ref 0–0.2)
BASOPHILS RELATIVE PERCENT: 0.3 % (ref 0–2)
BUN BLDV-MCNC: 7 MG/DL (ref 6–20)
CALCIUM SERPL-MCNC: 8.7 MG/DL (ref 8.6–10.2)
CHLORIDE BLD-SCNC: 103 MMOL/L (ref 98–107)
CO2: 25 MMOL/L (ref 22–29)
CREAT SERPL-MCNC: 0.6 MG/DL (ref 0.5–1)
EOSINOPHILS ABSOLUTE: 0.01 E9/L (ref 0.05–0.5)
EOSINOPHILS RELATIVE PERCENT: 0.2 % (ref 0–6)
GFR AFRICAN AMERICAN: >60
GFR NON-AFRICAN AMERICAN: >60 ML/MIN/1.73
GLUCOSE BLD-MCNC: 161 MG/DL (ref 74–99)
HCT VFR BLD CALC: 35.7 % (ref 34–48)
HEMOGLOBIN: 11.9 G/DL (ref 11.5–15.5)
IMMATURE GRANULOCYTES #: 0.05 E9/L
IMMATURE GRANULOCYTES %: 0.8 % (ref 0–5)
INR BLD: 1.2
LYMPHOCYTES ABSOLUTE: 1.04 E9/L (ref 1.5–4)
LYMPHOCYTES RELATIVE PERCENT: 15.8 % (ref 20–42)
MAGNESIUM: 1.9 MG/DL (ref 1.6–2.6)
MCH RBC QN AUTO: 30.4 PG (ref 26–35)
MCHC RBC AUTO-ENTMCNC: 33.3 % (ref 32–34.5)
MCV RBC AUTO: 91.3 FL (ref 80–99.9)
MONOCYTES ABSOLUTE: 0.46 E9/L (ref 0.1–0.95)
MONOCYTES RELATIVE PERCENT: 7 % (ref 2–12)
NEUTROPHILS ABSOLUTE: 5.02 E9/L (ref 1.8–7.3)
NEUTROPHILS RELATIVE PERCENT: 75.9 % (ref 43–80)
PDW BLD-RTO: 13.7 FL (ref 11.5–15)
PHOSPHORUS: 2.9 MG/DL (ref 2.5–4.5)
PLATELET # BLD: 180 E9/L (ref 130–450)
PMV BLD AUTO: 10.5 FL (ref 7–12)
POTASSIUM SERPL-SCNC: 4.2 MMOL/L (ref 3.5–5)
PROTHROMBIN TIME: 14.1 SEC (ref 9.3–12.4)
RBC # BLD: 3.91 E12/L (ref 3.5–5.5)
SODIUM BLD-SCNC: 137 MMOL/L (ref 132–146)
WBC # BLD: 6.6 E9/L (ref 4.5–11.5)

## 2021-09-13 PROCEDURE — 85610 PROTHROMBIN TIME: CPT

## 2021-09-13 PROCEDURE — 99232 SBSQ HOSP IP/OBS MODERATE 35: CPT | Performed by: INTERNAL MEDICINE

## 2021-09-13 PROCEDURE — 1200000000 HC SEMI PRIVATE

## 2021-09-13 PROCEDURE — 36415 COLL VENOUS BLD VENIPUNCTURE: CPT

## 2021-09-13 PROCEDURE — 6370000000 HC RX 637 (ALT 250 FOR IP): Performed by: INTERNAL MEDICINE

## 2021-09-13 PROCEDURE — 84100 ASSAY OF PHOSPHORUS: CPT

## 2021-09-13 PROCEDURE — 6370000000 HC RX 637 (ALT 250 FOR IP): Performed by: NURSE PRACTITIONER

## 2021-09-13 PROCEDURE — APPSS30 APP SPLIT SHARED TIME 16-30 MINUTES: Performed by: NURSE PRACTITIONER

## 2021-09-13 PROCEDURE — 94660 CPAP INITIATION&MGMT: CPT

## 2021-09-13 PROCEDURE — 85025 COMPLETE CBC W/AUTO DIFF WBC: CPT

## 2021-09-13 PROCEDURE — 80048 BASIC METABOLIC PNL TOTAL CA: CPT

## 2021-09-13 PROCEDURE — 83735 ASSAY OF MAGNESIUM: CPT

## 2021-09-13 RX ADMIN — GUAIFENESIN 200 MG: 200 SOLUTION ORAL at 23:45

## 2021-09-13 RX ADMIN — HYDROCODONE BITARTRATE AND ACETAMINOPHEN 1 TABLET: 5; 325 TABLET ORAL at 20:55

## 2021-09-13 RX ADMIN — PREDNISONE 40 MG: 20 TABLET ORAL at 08:46

## 2021-09-13 RX ADMIN — GUAIFENESIN 200 MG: 200 SOLUTION ORAL at 08:46

## 2021-09-13 RX ADMIN — PANTOPRAZOLE SODIUM 40 MG: 40 TABLET, DELAYED RELEASE ORAL at 05:57

## 2021-09-13 RX ADMIN — GUAIFENESIN 200 MG: 200 SOLUTION ORAL at 17:06

## 2021-09-13 ASSESSMENT — PAIN SCALES - GENERAL: PAINLEVEL_OUTOF10: 6

## 2021-09-13 NOTE — PROGRESS NOTES
[address] [place], [place] [address]   Progress Note    Admitting Date and Time: 9/11/2021  9:31 PM  Admit Dx: Bacteremia [R78.81]    Subjective:    Pt sitting on the side of the bed with no complaints of abdominal pain or fever/chills. Patient questions and concerns addressed. ROS: denies fever, chills, cp, sob, n/v, HA unless stated above.      ipratropium-albuterol  1 ampule Inhalation Q4H WA    budesonide  500 mcg Nebulization BID    pantoprazole  40 mg Oral QAM AC    predniSONE  40 mg Oral Daily    sodium chloride flush  10 mL IntraVENous 2 times per day    enoxaparin  40 mg SubCUTAneous Daily    cefTRIAXone (ROCEPHIN) IV  2,000 mg IntraVENous Q24H     albuterol, 1 ampule, Q6H PRN  HYDROcodone-acetaminophen, 1 tablet, Q4H PRN  perflutren lipid microspheres, 1.5 mL, ONCE PRN  guaiFENesin, 200 mg, Q4H PRN  sodium chloride flush, 10 mL, PRN  sodium chloride, 25 mL, PRN  promethazine, 12.5 mg, Q6H PRN   Or  ondansetron, 4 mg, Q6H PRN  polyethylene glycol, 17 g, Daily PRN  acetaminophen, 650 mg, Q6H PRN   Or  acetaminophen, 650 mg, Q6H PRN         Objective:    /84   Pulse 74   Temp 97.8 °F (36.6 °C) (Oral)   Resp 18   Wt (!) 412 lb (186.9 kg)   SpO2 95%   BMI 66.50 kg/m²   General Appearance: alert and oriented to person, place and time and in no acute distress, morbidly obese  Skin: warm and dry  Head: normocephalic and atraumatic  Eyes: pupils equal, round, and reactive to light, conjunctivae normal  Neck: neck supple and non tender without mass   Pulmonary/Chest: decreased in posterior bases to auscultation bilaterally, normal air movement, no respiratory distress  Cardiovascular: normal rate, normal S1 and S2 and no carotid bruits  Abdomen: soft, non-tender, non-distended, normal bowel sounds   Extremities: no cyanosis, no clubbing and generalized edema  Neurologic: no cranial nerve deficit and speech normal      Recent Labs     09/11/21  2203 09/12/21  0437 09/13/21  0431    136 137   K 3.9 3.9 4.2    104 103   CO2 25 26 25   BUN 9 8 7   CREATININE 0.8 0.7 0.6   GLUCOSE 143* 135* 161*   CALCIUM 8.3* 8.4* 8.7       Recent Labs     09/10/21  1237 09/11/21 2203 09/12/21 0437   ALKPHOS 106* 84 89   PROT 6.9 6.5 6.4   LABALBU 3.6 2.9* 3.1*   BILITOT 1.0 0.9 0.7   AST 36* 77* 64*   ALT 50* 127* 113*       Recent Labs     09/11/21 2203 09/12/21 0437 09/13/21  0430   WBC 8.0 7.7 6.6   RBC 3.90 3.81 3.91   HGB 12.0 11.6 11.9   HCT 36.3 35.0 35.7   MCV 93.1 91.9 91.3   MCH 30.8 30.4 30.4   MCHC 33.1 33.1 33.3   RDW 13.8 13.6 13.7    181 180   MPV 10.1 10.4 10.5       HgBA1c:    Lab Results   Component Value Date    LABA1C 6.7 09/12/2021        Radiology:   CT CHEST WO CONTRAST   Final Result   Normal noncontrast CT of the chest.  There is no acute infiltrates or   effusions. XR CHEST (2 VW)   Final Result   1. Cardiomegaly. There are no gross findings of CHF or pneumonia. 2. The chest x-ray is under penetrated due to the patient's body habitus. If   there is high suspicion for COVID pneumonia dedicated CT of the thorax is   recommended. US ABDOMEN LIMITED   Final Result   Unremarkable right upper quadrant ultrasound. US RETROPERITONEAL LIMITED   Final Result   Limited examination due to the patient's body habitus. No obvious acute   abnormality involving the kidneys or urinary bladder. Assessment:  Principal Problem:    Bacteremia  Active Problems:    Sleep apnea    Asthma with acute exacerbation    Hyperglycemia    Morbid obesity with BMI of 60.0-69.9, adult (HCC)    Acute cystitis with hematuria  Resolved Problems:    * No resolved hospital problems. *      Plan:  1. Bacteremia - elevated procalcitonin 9.76 - afebrile - IV Rocephin continued - Ecoli  - repeat blood culture preliminary negative - IVF's continue    2.  Abdominal pain - elevated LFT's that are trending down - continue current pain medication -   Unremarkable right upper quadrant ultrasound. 3. Acute cystitis with hematuria - H&H stable - urine culture demonstrated <10,000 CFU/mL Gram positive organism - on IV antibiotic for #1     4. Acute exacerbation - breathing easy on room air - breathing treatment Pulmicort/Duoneb - Prednisone - echocardiogram EF 55-60%, less than mild valvular regurgitation/ stenosis. No gross evidence of endocarditis  -   Chest CT scan normal noncontrast CT of the chest.  There is no acute infiltrates or effusions. 5. Hyperglycemia - BG elevated which maybe d/t steroids - A1c 6.7     6. Morbidly obese - BMI >66 - most likely d/t taking in more calories than the body requires    7. Sleep apnea - uses CPAP at HS     Disposition - discharge possibly tomorrow     NOTE: This report was transcribed using voice recognition software. Every effort was made to ensure accuracy; however, inadvertent computerized transcription errors may be present.      Electronically signed by LISA Best CNP on 9/13/2021 at 9:06 AM

## 2021-09-13 NOTE — CARE COORDINATION
CM note; Pt up independently, plan is to return home at discharge, will follow for transition to oral antibiotics.

## 2021-09-13 NOTE — PLAN OF CARE
Problem: Falls - Risk of:  Goal: Will remain free from falls  Description: Will remain free from falls  9/13/2021 0016 by Eric Thompson RN  Outcome: Met This Shift  9/13/2021 0014 by Eric Thompson RN  Outcome: Met This Shift  Goal: Absence of physical injury  Description: Absence of physical injury  9/13/2021 0016 by Eric Thompson RN  Outcome: Met This Shift  9/13/2021 0014 by Eric Thompson RN  Outcome: Met This Shift     Problem: Pain:  Goal: Pain level will decrease  Description: Pain level will decrease  Outcome: Met This Shift  Goal: Control of acute pain  Description: Control of acute pain  Outcome: Met This Shift  Goal: Control of chronic pain  Description: Control of chronic pain  Outcome: Met This Shift

## 2021-09-14 VITALS
DIASTOLIC BLOOD PRESSURE: 83 MMHG | TEMPERATURE: 98.7 F | BODY MASS INDEX: 47.09 KG/M2 | WEIGHT: 293 LBS | HEART RATE: 76 BPM | RESPIRATION RATE: 18 BRPM | HEIGHT: 66 IN | SYSTOLIC BLOOD PRESSURE: 141 MMHG | OXYGEN SATURATION: 92 %

## 2021-09-14 LAB
ALBUMIN SERPL-MCNC: 2.9 G/DL (ref 3.5–5.2)
ALP BLD-CCNC: 112 U/L (ref 35–104)
ALT SERPL-CCNC: 119 U/L (ref 0–32)
ANION GAP SERPL CALCULATED.3IONS-SCNC: 8 MMOL/L (ref 7–16)
AST SERPL-CCNC: 47 U/L (ref 0–31)
BASOPHILS ABSOLUTE: 0.02 E9/L (ref 0–0.2)
BASOPHILS RELATIVE PERCENT: 0.3 % (ref 0–2)
BILIRUB SERPL-MCNC: 0.3 MG/DL (ref 0–1.2)
BUN BLDV-MCNC: 13 MG/DL (ref 6–20)
CALCIUM SERPL-MCNC: 8.3 MG/DL (ref 8.6–10.2)
CHLORIDE BLD-SCNC: 105 MMOL/L (ref 98–107)
CO2: 26 MMOL/L (ref 22–29)
CREAT SERPL-MCNC: 0.7 MG/DL (ref 0.5–1)
CULTURE, BLOOD 2: ABNORMAL
EOSINOPHILS ABSOLUTE: 0.12 E9/L (ref 0.05–0.5)
EOSINOPHILS RELATIVE PERCENT: 1.7 % (ref 0–6)
GFR AFRICAN AMERICAN: >60
GFR NON-AFRICAN AMERICAN: >60 ML/MIN/1.73
GLUCOSE BLD-MCNC: 136 MG/DL (ref 74–99)
HCT VFR BLD CALC: 44.4 % (ref 34–48)
HEMOGLOBIN: 14.2 G/DL (ref 11.5–15.5)
IMMATURE GRANULOCYTES #: 0.08 E9/L
IMMATURE GRANULOCYTES %: 1.1 % (ref 0–5)
LYMPHOCYTES ABSOLUTE: 2.09 E9/L (ref 1.5–4)
LYMPHOCYTES RELATIVE PERCENT: 29.6 % (ref 20–42)
MCH RBC QN AUTO: 29.7 PG (ref 26–35)
MCHC RBC AUTO-ENTMCNC: 32 % (ref 32–34.5)
MCV RBC AUTO: 92.9 FL (ref 80–99.9)
MONOCYTES ABSOLUTE: 0.54 E9/L (ref 0.1–0.95)
MONOCYTES RELATIVE PERCENT: 7.6 % (ref 2–12)
NEUTROPHILS ABSOLUTE: 4.21 E9/L (ref 1.8–7.3)
NEUTROPHILS RELATIVE PERCENT: 59.7 % (ref 43–80)
ORGANISM: ABNORMAL
ORGANISM: ABNORMAL
PDW BLD-RTO: 14 FL (ref 11.5–15)
PLATELET # BLD: 181 E9/L (ref 130–450)
PMV BLD AUTO: 10 FL (ref 7–12)
POTASSIUM SERPL-SCNC: 4.2 MMOL/L (ref 3.5–5)
RBC # BLD: 4.78 E12/L (ref 3.5–5.5)
SODIUM BLD-SCNC: 139 MMOL/L (ref 132–146)
TOTAL PROTEIN: 6.5 G/DL (ref 6.4–8.3)
URINE CULTURE, ROUTINE: NORMAL
WBC # BLD: 7.1 E9/L (ref 4.5–11.5)

## 2021-09-14 PROCEDURE — 99239 HOSP IP/OBS DSCHRG MGMT >30: CPT | Performed by: INTERNAL MEDICINE

## 2021-09-14 PROCEDURE — 36415 COLL VENOUS BLD VENIPUNCTURE: CPT

## 2021-09-14 PROCEDURE — 85025 COMPLETE CBC W/AUTO DIFF WBC: CPT

## 2021-09-14 PROCEDURE — 80053 COMPREHEN METABOLIC PANEL: CPT

## 2021-09-14 PROCEDURE — 6370000000 HC RX 637 (ALT 250 FOR IP): Performed by: INTERNAL MEDICINE

## 2021-09-14 PROCEDURE — 2580000003 HC RX 258: Performed by: INTERNAL MEDICINE

## 2021-09-14 PROCEDURE — 94660 CPAP INITIATION&MGMT: CPT

## 2021-09-14 PROCEDURE — 6370000000 HC RX 637 (ALT 250 FOR IP): Performed by: NURSE PRACTITIONER

## 2021-09-14 PROCEDURE — 6360000002 HC RX W HCPCS: Performed by: INTERNAL MEDICINE

## 2021-09-14 PROCEDURE — APPSS45 APP SPLIT SHARED TIME 31-45 MINUTES: Performed by: NURSE PRACTITIONER

## 2021-09-14 RX ORDER — PREDNISONE 20 MG/1
TABLET ORAL
Qty: 7 TABLET | Refills: 0 | Status: SHIPPED | OUTPATIENT
Start: 2021-09-15 | End: 2021-09-21

## 2021-09-14 RX ORDER — LEVOFLOXACIN 750 MG/1
750 TABLET ORAL DAILY
Qty: 14 TABLET | Refills: 0 | Status: SHIPPED | OUTPATIENT
Start: 2021-09-14 | End: 2021-09-28

## 2021-09-14 RX ORDER — HYDROCODONE BITARTRATE AND ACETAMINOPHEN 5; 325 MG/1; MG/1
1 TABLET ORAL EVERY 6 HOURS PRN
Qty: 12 TABLET | Refills: 0 | Status: SHIPPED | OUTPATIENT
Start: 2021-09-14 | End: 2021-09-17

## 2021-09-14 RX ORDER — GUAIFENESIN 100 MG/5ML
200 SOLUTION ORAL EVERY 4 HOURS PRN
Qty: 1200 ML | Refills: 0 | Status: SHIPPED | OUTPATIENT
Start: 2021-09-14

## 2021-09-14 RX ORDER — PANTOPRAZOLE SODIUM 40 MG/1
40 TABLET, DELAYED RELEASE ORAL
Qty: 10 TABLET | Refills: 0 | Status: SHIPPED | OUTPATIENT
Start: 2021-09-15

## 2021-09-14 RX ADMIN — WATER 2000 MG: 1 INJECTION INTRAMUSCULAR; INTRAVENOUS; SUBCUTANEOUS at 07:37

## 2021-09-14 RX ADMIN — GUAIFENESIN 200 MG: 200 SOLUTION ORAL at 05:51

## 2021-09-14 RX ADMIN — PREDNISONE 40 MG: 20 TABLET ORAL at 07:23

## 2021-09-14 RX ADMIN — PANTOPRAZOLE SODIUM 40 MG: 40 TABLET, DELAYED RELEASE ORAL at 05:51

## 2021-09-14 RX ADMIN — HYDROCODONE BITARTRATE AND ACETAMINOPHEN 1 TABLET: 5; 325 TABLET ORAL at 07:23

## 2021-09-14 ASSESSMENT — PAIN SCALES - GENERAL
PAINLEVEL_OUTOF10: 7
PAINLEVEL_OUTOF10: 0
PAINLEVEL_OUTOF10: 0

## 2021-09-14 NOTE — DISCHARGE SUMMARY
[facility] Physician Discharge Summary             Call from list of primary care physicians provided to be seen as soon as possible       Activity level: As Tolerated     Diet: ADULT DIET; Regular    Dispo:Home    Condition at discharge: Stable        Patient ID:  Reinaldo Glass  [id number]  47 y.o.  1980    Admit date: 9/11/2021    Discharge date and time:  9/14/2021  11:01 AM    Admission Diagnoses: Principal Problem:    Bacteremia  Active Problems:    Sleep apnea    Asthma with acute exacerbation    Hyperglycemia    Morbid obesity with BMI of 60.0-69.9, adult ([address]    Acute cystitis with hematuria    Elevated hemoglobin A1c  Resolved Problems:    * No resolved hospital problems. *      Discharge Diagnoses: Principal Problem:    Bacteremia  Active Problems:    Sleep apnea    Asthma with acute exacerbation    Hyperglycemia    Morbid obesity with BMI of 60.0-69.9, adult (HCC)    Acute cystitis with hematuria    Elevated hemoglobin A1c  Resolved Problems:    * No resolved hospital problems. *      Consults:  None    Procedures: Echocardiogram     Hospital Course: Patient was admitted with Bacteremia [R78.81]. Patient is a 46 y.o female who presented to the ED with complaints of abnormal labs. Patient was admitted for bacteremia. During patient brief hospital stay patient received IV fluids, pain medication, IV antibiotics and breathing treatments. Patient had positive blood cultures that grew gram negative rods (E. Coli). Patient was on IV Rocephin 2 gms. Patient also was treated for positive UTI growing Gram positive organism < 10,000. Patient had repeat blood cultures drawn that were essentially negative. Patient exacerbation of her asthma was treated with oral Prednisone and breathing treatment such as Pulmicort and Duoneb. Patient is breathing easy on room air with no acute respiratory distress noted.  Patient has remained stable therefore she will be discharged home with the following medications and instructions. Discharge Exam:  Vitals:    09/13/21 0630 09/13/21 1800 09/14/21 0005 09/14/21 0558   BP: 135/84 (!) 119/91  (!) 141/83   Pulse: 74 76  76   Resp: 18 16  18   Temp: 97.8 °F (36.6 °C) 98.3 °F (36.8 °C)  98.7 °F (37.1 °C)   TempSrc: Oral Oral  Oral   SpO2: 95% 95% 96% 92%   Weight:           General Appearance: alert and oriented to person, place and time, well-developed and well-nourished, in no acute distress and obese  Skin: warm and dry  Head: normocephalic and atraumatic  Eyes: pupils equal, round, and reactive to light and conjunctivae normal  ENT: hearing grossly normal bilaterally  Neck: neck supple and non tender without mass, no thyromegaly or thyroid nodules, no cervical lymphadenopathy   Pulmonary/Chest: diminished and clear to auscultation bilaterally- no wheezes, rales or rhonchi, normal air movement, no respiratory distress  Cardiovascular: normal rate, regular rhythm, normal S1 and S2 and intact distal pulses  Abdomen: soft, non-tender, non-distended, normal bowel sounds, no masses or organomegaly  Extremities: no cyanosis, no clubbing and generalized + edema  Musculoskeletal: no swollen joints, no joint deformity and no tender joints  Neurologic: no cranial nerve deficit, gait and coordination normal and speech normal    I/O last 3 completed shifts:   In: 240 [P.O.:240]  Out: 1925 [Urine:1925]  I/O this shift:  In: 240 [P.O.:240]  Out: -       LABS:  Recent Labs     09/12/21 0437 09/13/21  0431 09/14/21  0700    137 139   K 3.9 4.2 4.2    103 105   CO2 26 25 26   BUN 8 7 13   CREATININE 0.7 0.6 0.7   GLUCOSE 135* 161* 136*   CALCIUM 8.4* 8.7 8.3*       Recent Labs     09/12/21  0437 09/13/21  0430 09/14/21  0700   WBC 7.7 6.6 7.1   RBC 3.81 3.91 4.78   HGB 11.6 11.9 14.2   HCT 35.0 35.7 44.4   MCV 91.9 91.3 92.9   MCH 30.4 30.4 29.7   MCHC 33.1 33.3 32.0   RDW 13.6 13.7 14.0    180 181   MPV 10.4 10.5 10.0       No results for input(s): POCGLU in the last 72 hours. Imaging:   CT CHEST WO CONTRAST   Final Result   Normal noncontrast CT of the chest.  There is no acute infiltrates or   effusions. XR CHEST (2 VW)   Final Result   1. Cardiomegaly. There are no gross findings of CHF or pneumonia. 2. The chest x-ray is under penetrated due to the patient's body habitus. If   there is high suspicion for COVID pneumonia dedicated CT of the thorax is   recommended. US ABDOMEN LIMITED   Final Result   Unremarkable right upper quadrant ultrasound. US RETROPERITONEAL LIMITED   Final Result   Limited examination due to the patient's body habitus. No obvious acute   abnormality involving the kidneys or urinary bladder. Patient Instructions:      Medication List      START taking these medications    guaiFENesin 100 MG/5ML Soln oral solution  Commonly known as: ROBITUSSIN  Take 10 mLs by mouth every 4 hours as needed for Cough     levoFLOXacin 750 MG tablet  Commonly known as: Levaquin  Take 1 tablet by mouth daily for 14 days     pantoprazole 40 MG tablet  Commonly known as: PROTONIX  Take 1 tablet by mouth every morning (before breakfast)  Start taking on: September 15, 2021     predniSONE 20 MG tablet  Commonly known as: DELTASONE  Take 2 tablets by mouth daily for 2 days, THEN 1 tablet daily for 2 days, THEN 0.5 tablets daily for 2 days. Start taking on: September 15, 2021        CHANGE how you take these medications    HYDROcodone-acetaminophen 5-325 MG per tablet  Commonly known as: Norco  Take 1 tablet by mouth every 6 hours as needed for Pain for up to 3 days.   What changed:   · when to take this  · additional instructions        CONTINUE taking these medications    * albuterol sulfate  (90 Base) MCG/ACT inhaler     * albuterol 0.63 MG/3ML nebulizer solution  Commonly known as: ACCUNEB     ketorolac 10 MG tablet  Commonly known as: TORADOL  Take 1 tablet by mouth every 6 hours as needed for Pain         * This list has 2 medication(s) that are the same as other medications prescribed for you. Read the directions carefully, and ask your doctor or other care provider to review them with you. STOP taking these medications    ondansetron 4 MG disintegrating tablet  Commonly known as: Zofran ODT           Where to Get Your Medications      These medications were sent to [facility]/pharmacy #[id number]- [place] - [address], [address] [place] [address]    Phone: [phone]   · guaiFENesin 100 MG/5ML Soln oral solution  · HYDROcodone-acetaminophen 5-325 MG per tablet  · levoFLOXacin 750 MG tablet  · pantoprazole 40 MG tablet  · predniSONE 20 MG tablet           Note that more than 30 minutes was spent in preparing discharge papers, discussing discharge with patient, medication review, etc.    Signed:  Electronically signed by LISA Raza CNP on 9/14/2021 at 11:01 AM    NOTE: This report was transcribed using voice recognition software. Every effort was made to ensure accuracy; however, inadvertent computerized transcription errors may be present.

## 2021-09-17 LAB
BLOOD CULTURE, ROUTINE: NORMAL
CULTURE, BLOOD 2: NORMAL

## 2021-09-26 NOTE — PROGRESS NOTES
Physician Progress Note      PATIENT:               Elena Rueda  CSN #:                  [id number]  :                       1980  ADMIT DATE:       2021 9:31 PM  [facility] DATE:        2021 11:20 AM  RESPONDING  PROVIDER #:        Parker Berkowitz DO          QUERY TEXT:    Dear Dr. Remy Francis,    Patient admitted with bacteremia, noted to have cultures showing pansensitive   E. coli. If possible, please document in progress notes and discharge   summary if you are evaluating and/or treating any of the following: The medical record reflects the following:  Risk Factors: morbid obesity, likely DM, ELFEGO, asthma  Clinical Indicators: per pn :  \"Gram-negative bacteremia-continue   ceftriaxone 2 g IV daily. Culture showing pansensitive E. coli. Echocardiogram negative. Given urinalysis suggesting UTI with cystitis, this   may be the source of bacteremia. Plan for 2 weeks of oral levofloxacin on   discharge if repeat cultures are negative at 48 hours. \"; echo negative for   vegetation; WBC 7.7-7.1; lactic 3.9-1.4; procalcitonin 9.76; TPR:   101.0-97.8/109-76/22-16  Treatment: echo, repeat blood cultures, Ua with culture,  IV Ceftriaxone 2 gm,   Complete 2 weeks of oral levofloxacin 750 mg daily at discharge    Thank You,  Zak Lion RN, BSN, CDS  Clinical Documentation Improvement Specialist  [phone]  Options provided:  -- E coli Bacteremia due to sepsis  -- E coli bacteremia due to viral infection  -- Other - I will add my own diagnosis  -- Disagree - Not applicable / Not valid  -- Disagree - Clinically unable to determine / Unknown  -- Refer to Clinical Documentation Reviewer    PROVIDER RESPONSE TEXT:    Provider disagreed with this query.   E coli bacteremia due to UTI without sepsis    Query created by: Blayne Elena on 2021 10:45 AM      Electronically signed by:  Parker Berkowitz DO 2021 1:50 PM

## (undated) DEVICE — ELECTRODE ES L11CM DIA3MM BALL TIP BLK DISP UTAHLOOP (MIN BUY OF 10 EACHES)

## (undated) DEVICE — PAD,NON-ADHERENT,3X8,STERILE,LF,1/PK: Brand: MEDLINE

## (undated) DEVICE — SPECIMEN CUP W/LID: Brand: DEROYAL

## (undated) DEVICE — GENERATOR ELECSURG G400 GYRUS] OLYMPUS AMERICA INC]

## (undated) DEVICE — Z INACTIVE USE 2635504 SOLUTION IRRIG 3000ML 1.5% GL USP UROMATIC CONT

## (undated) DEVICE — GAUZE,SPONGE,4"X4",16PLY,XRAY,STRL,LF: Brand: MEDLINE

## (undated) DEVICE — PAD MATERNITY CURITY ADH STRIP DISP

## (undated) DEVICE — CAMERA STRYKER 1488 HD GEN

## (undated) DEVICE — CONTROL SYRINGE LUER-LOCK TIP: Brand: MONOJECT

## (undated) DEVICE — TRAY PROCED DILATATION CURETTAGE

## (undated) DEVICE — Z DISCONTINUED NO SUB IDED KIT ENDOMET ABLAT IMPED CTRL DEV W/ RF CTRL FTSWCH SUCT LN

## (undated) DEVICE — GOWN,SIRUS,NONRNF,SETINSLV,XL,20/CS: Brand: MEDLINE

## (undated) DEVICE — FLUID MANAGEMENT SYSTEM, INTEGRATED TUBE SET, DO NOT USE IF PACKAGE IS DAMAGED, KEEP DRY, KEEP AWAY FROM SUNLIGHT, KEEP AWAY FROM HEAT AND RADIOACTIVE SOURCES: Brand: FLUID SAFE

## (undated) DEVICE — ELECTRODE ARTHSCP W10MM D10MM SHFT 11CM YEL MPLR LOOP W/

## (undated) DEVICE — CURETTE VACUUM 3.1MM SUCTION ENDOMETRIAL

## (undated) DEVICE — DOUBLE BASIN SET: Brand: MEDLINE INDUSTRIES, INC.

## (undated) DEVICE — TRAY PROCED HYSTEROSCOPY CIRCON 1

## (undated) DEVICE — KIT EXTN LN IV CONDUCT FLUIDS FOR GRAVITATIONAL IV ADMIN SGL

## (undated) DEVICE — LENS CORD GYN 0-DEG 5 MM CIRCON

## (undated) DEVICE — MARKER,SKIN,WI/RULER AND LABELS: Brand: MEDLINE

## (undated) DEVICE — PENCIL ES L3M BTTN SWCH HOLSTER W/ BLDE ELECTRD EDGE

## (undated) DEVICE — GLOVE SURG SZ 65 THK91MIL LTX FREE SYN POLYISOPRENE

## (undated) DEVICE — TOWEL,OR,DSP,ST,BLUE,STD,6/PK,12PK/CS: Brand: MEDLINE

## (undated) DEVICE — NEEDLE SPNL 22GA L3.5IN BLK HUB S STL REG WALL FIT STYL W/

## (undated) DEVICE — TRAY,VAG PREP,2PR VNYL GLV,4 C: Brand: MEDLINE INDUSTRIES, INC.